# Patient Record
Sex: FEMALE | Race: WHITE | NOT HISPANIC OR LATINO | Employment: FULL TIME | URBAN - METROPOLITAN AREA
[De-identification: names, ages, dates, MRNs, and addresses within clinical notes are randomized per-mention and may not be internally consistent; named-entity substitution may affect disease eponyms.]

---

## 2023-10-22 DIAGNOSIS — Z94.0 TRANSPLANTED KIDNEY: ICD-10-CM

## 2023-10-22 RX ORDER — TACROLIMUS 0.5 MG/1
CAPSULE ORAL
Qty: 270 CAPSULE | Refills: 3 | Status: SHIPPED | OUTPATIENT
Start: 2023-10-22

## 2023-10-30 DIAGNOSIS — Z94.0 KIDNEY TRANSPLANTED: ICD-10-CM

## 2023-10-30 DIAGNOSIS — I12.9 PARENCHYMAL RENAL HYPERTENSION, STAGE 1 THROUGH STAGE 4 OR UNSPECIFIED CHRONIC KIDNEY DISEASE: ICD-10-CM

## 2023-10-30 DIAGNOSIS — E78.5 DYSLIPIDEMIA: ICD-10-CM

## 2023-10-30 RX ORDER — MYCOPHENOLATE MOFETIL 500 MG/1
500 TABLET ORAL 2 TIMES DAILY
Qty: 180 TABLET | Refills: 3 | Status: SHIPPED | OUTPATIENT
Start: 2023-10-30

## 2023-10-30 RX ORDER — PREDNISONE 5 MG/1
5 TABLET ORAL DAILY
Qty: 90 TABLET | Refills: 3 | Status: SHIPPED | OUTPATIENT
Start: 2023-10-30

## 2023-11-13 ENCOUNTER — TELEPHONE (OUTPATIENT)
Dept: NEPHROLOGY | Facility: CLINIC | Age: 58
End: 2023-11-13

## 2023-11-13 NOTE — TELEPHONE ENCOUNTER
Spoke with patient about the following, she expressed understanding and thanked us for the call:    ----- Message from Sridhar Jefferson MD sent at 11/13/2023  8:14 AM EST -----  Labs look good  ----- Message -----  From: [facility] Results In  Sent: 11/13/2023   8:05 AM EST  To: Sridhar Jefferson MD

## 2023-11-14 LAB
ALBUMIN SERPL-MCNC: 4.2 G/DL (ref 3.6–5.1)
ALBUMIN/GLOB SERPL: 1.8 (CALC) (ref 1–2.5)
ALP SERPL-CCNC: 49 U/L (ref 37–153)
ALT SERPL-CCNC: 11 U/L (ref 6–29)
AST SERPL-CCNC: 15 U/L (ref 10–35)
BASOPHILS # BLD AUTO: 63 CELLS/UL (ref 0–200)
BASOPHILS NFR BLD AUTO: 1 %
BILIRUB SERPL-MCNC: 0.4 MG/DL (ref 0.2–1.2)
BUN SERPL-MCNC: 16 MG/DL (ref 7–25)
BUN/CREAT SERPL: NORMAL (CALC) (ref 6–22)
CALCIUM SERPL-MCNC: 9.4 MG/DL (ref 8.6–10.4)
CALCIUM SERPL-MCNC: 9.4 MG/DL (ref 8.6–10.4)
CHLORIDE SERPL-SCNC: 109 MMOL/L (ref 98–110)
CO2 SERPL-SCNC: 27 MMOL/L (ref 20–32)
CREAT SERPL-MCNC: 0.72 MG/DL (ref 0.5–1.03)
CREAT UR-MCNC: 101 MG/DL (ref 20–275)
EOSINOPHIL # BLD AUTO: 82 CELLS/UL (ref 15–500)
EOSINOPHIL NFR BLD AUTO: 1.3 %
ERYTHROCYTE [DISTWIDTH] IN BLOOD BY AUTOMATED COUNT: 12.3 % (ref 11–15)
GFR/BSA.PRED SERPLBLD CYS-BASED-ARV: 98 ML/MIN/1.73M2
GLOBULIN SER CALC-MCNC: 2.4 G/DL (CALC) (ref 1.9–3.7)
GLUCOSE SERPL-MCNC: 74 MG/DL (ref 65–99)
HCT VFR BLD AUTO: 43.9 % (ref 35–45)
HGB BLD-MCNC: 14.9 G/DL (ref 11.7–15.5)
LYMPHOCYTES # BLD AUTO: 2602 CELLS/UL (ref 850–3900)
LYMPHOCYTES NFR BLD AUTO: 41.3 %
MAGNESIUM SERPL-MCNC: 1.6 MG/DL (ref 1.5–2.5)
MCH RBC QN AUTO: 32 PG (ref 27–33)
MCHC RBC AUTO-ENTMCNC: 33.9 G/DL (ref 32–36)
MCV RBC AUTO: 94.2 FL (ref 80–100)
MONOCYTES # BLD AUTO: 844 CELLS/UL (ref 200–950)
MONOCYTES NFR BLD AUTO: 13.4 %
NEUTROPHILS # BLD AUTO: 2709 CELLS/UL (ref 1500–7800)
NEUTROPHILS NFR BLD AUTO: 43 %
PHOSPHATE SERPL-MCNC: 3.5 MG/DL (ref 2.5–4.5)
PLATELET # BLD AUTO: 198 THOUSAND/UL (ref 140–400)
PMV BLD REES-ECKER: 10.7 FL (ref 7.5–12.5)
POTASSIUM SERPL-SCNC: 3.8 MMOL/L (ref 3.5–5.3)
PROT SERPL-MCNC: 6.6 G/DL (ref 6.1–8.1)
PROT UR-MCNC: 8 MG/DL (ref 5–24)
PROT/CREAT UR: 0.08 MG/MG CREAT (ref 0.02–0.18)
PROT/CREAT UR: 79 MG/G CREAT (ref 24–184)
PTH-INTACT SERPL-MCNC: 30 PG/ML (ref 16–77)
RBC # BLD AUTO: 4.66 MILLION/UL (ref 3.8–5.1)
SODIUM SERPL-SCNC: 143 MMOL/L (ref 135–146)
TACROLIMUS BLD-MCNC: 4 MCG/L
WBC # BLD AUTO: 6.3 THOUSAND/UL (ref 3.8–10.8)

## 2023-11-15 ENCOUNTER — TELEPHONE (OUTPATIENT)
Dept: NEPHROLOGY | Facility: CLINIC | Age: 58
End: 2023-11-15

## 2023-11-15 NOTE — TELEPHONE ENCOUNTER
Spoke with patient about the following, she expressed understanding and thanked us for the call:    ----- Message from Grace Herring MD sent at 11/14/2023  4:39 PM EST -----  Tacrolimus level good

## 2023-11-27 NOTE — PROGRESS NOTES
RENAL FOLLOW UP NOTE:.td    ASSESSMENT AND PLAN:  1.  CKD stage 2.:  Etiology:  Status post LRT 1997. Baseline creatinine:  Less than 1  Current creatinine:  0.72 mg/dL at baseline  Urine protein creatinine ratio: Insignificant at goal  Tacrolimus trough level : 4.0 at goal  Recommendations:  Treat hypertension-please see below  Treat dyslipidemia-please see below  Continue immunosuppressive medications as currently prescribed  Maintain proteinuria less than 1 g or as low as possible  Avoid nephrotoxic agents such as NSAIDs, patient counseled as such  2.  Volume:  Euvolemic  3. Hypertension:   Home blood pressure readings:    A.m.: 130/77, standing 115/74  P.m. 136/77, standing 121/74  Heart rate: 60-70 range     Goal blood pressure:  Less than 130/80  Recommendations:  Push nonmedical regimen including weight loss, isotonic exercise and avoidance of salt; patient counseled as such  Medication changes today:  Change timing of losartan 25 mg daily in the morning to help with evening readings  Because of orthostasis I am not can make any changes  Continue to push diet and exercise     4. Electrolytes: All acceptable     5. 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 Dyslipidemia:  Goal LDL:  Less than 100  Current lipid profile:  LDL 59/HDL 73/triglycerides 106 from July 2023  Recommendations:  No changes at this time as patient is at goal     7. Anemia:  Normal  essentially at 14.9, with a normal platelet count a 636 K     8. Other problems:  History of urinary tract infections monitored:   Follow-up urinalysis back in April was unremarkable: UA from 4/15/2023 no evidence of urinary tract infection; ultrasound was normal transplant kidney mild increased resistive index from April 24, 2020  Patient with dizziness abnormal ECG from 02/07/2020 which was personally reviewed by Dr. Todd Tejeda who felt there is no significant change. Dr. Todd Tejeda felt she had vertigo which essentially resolved. No further cardiac testing was recommended at that time. We will order a bone density to make sure the Fosamax and vitamin D calcium is maintaining good bone health     GI health maintenance:  Patient had her last colonoscopy September 28, 2018 requires another colonoscopy September 2023 (5 years later per Dr. Gissel Hoffmann) patient is aware she will schedule for early 2024    PATIENT INSTRUCTIONS:    Patient Instructions   Visit summary:  - Overall labs look great including kidney function  - Blood pressure is close to goal I am going to just adjust 1 medication as we discussed please see below graph we will obtain a bone density to make sure your you have good bone health        1. Medication changes today:  Please take losartan 25 mg daily in the morning to help with your evening readings    2. General instructions:  Continue to push diet exercise avoidance of salt  Continue with maintenance recommendations please see below    Please arrange for DEXA scan    .     3.  Please take 1 week a blood pressure readings prior to your next appointment    AS FOLLOWS  MORNING AND EVENING, SITTING AND STANDING as follows:  TAKE THE MORNING READINGS BEFORE ANY MEDICATIONS AND WHEN YOU ARE RELAXED FOR SEVERAL MINUTES  TAKE THE EVENING READINGS:  BETWEEN 7-10 P.M.; PRIOR TO ANY MEDICATIONS; AT LEAST IN OUR  FROM DINNER; AND CERTAINLY AFTER RELAXING FOR A FEW MINUTES  PLEASE INCLUDE HEART RATE WITH YOUR BLOOD PRESSURE READINGS  When taking standing readings, keep your arm supported at heart level and not dangling  Make sure you are sitting with your back supported and feet on the ground and do not cross your legs or feet  Make sure you have not taken any coffee or caffeine products or exercised or smoke cigarettes at least 30 minutes before taking your blood pressure  Then please mail these readings into the office    4. Follow-up in 4 months  Please bring in 1 week a blood pressure readings morning evening, sitting and standing is outlined above  PLEASE BRING AN YOUR BLOOD PRESSURE MACHINE TO CORRELATE WITH THE OFFICE MACHINE AT THIS NEXT SCHEDULED VISIT  Please go for fasting lab work 1-2 weeks prior to your appointment      5. General non medical recommendations:  AVOID SALT BUT NOT ADDING AN READING LABELS TO MAKE SURE THERE IS LOW-SALT IN THE FOOD THAT YOU ARE EATING  Goal is less than 2 g of sodium intake or less than 5 g of sodium chloride intake per day    Avoid nonsteroidal anti-inflammatory drugs such as Naprosyn, ibuprofen, Aleve, Advil, Celebrex, Meloxicam (Mobic) etc.  You can use Tylenol as needed if you do not have any liver condition to be concerned about    Avoid medications such as Sudafed or decongestants and antihistamines that contained the D component which is the decongestant. You can take antihistamines without the decongestant or D component. Try to avoid medications such as pantoprazole or  Protonix/Nexium or Esomeprazole)/Prilosec or omeprazole/Prevacid or lansoprazole/AcipHex or Rabeprazole. If you are able to, use Pepcid as this is safer for your kidneys. Try to exercise at least 30 minutes 3 days a week to begin with with an ultimate goal of 5 days a week for at least 30 minutes    Try to lose 5-10 lb by your next visit    Please do not drink more than 2 glasses of alcohol/wine on a daily basis as this may contribute to your high blood pressure.       6.Maintenance recommendations:  Please see your dentist at least twice a year for oral/mouth cancer screening  Please see your dermatologist/skin doctor at least twice a year for skin cancer screening  Please follow-up with your gastroenterologist for colon cancer screening when  appropriate per their recommendations  Please see your obstetrician/gynecologist for cancer screening at least once a year per their recommendations  Please obtain the following vaccines:  Yearly flu/influenza vaccine  Pneumonia vaccine every 3-5 years:  Prevnar 13 and Pneumovax 23  Shingrix which is the new non live virus, this is against shingles/herpes zoster  Tetanus:Td/DTaP vaccine   COVID-19 vaccine: At this time only [address] DO NOT EXCEPT OR GET THE J AND J VACCINE!!  YOU SHOULD NEVER GET ANY LIVE VIRUSES:  INCLUDING ZOSTAVAX OR MMR:  SHOULD NEVER BE TAKEN    BECAUSE OF YOUR KIDNEY TRANSPLANT , YOU ARE TAKING IMMUNE SUPPRESSING MEDICATIONS WHICH MAKES YOU MORE SUSCEPTIBLE TO NOT ONLY KIRTI/CATCHING COVID-19 VIRUS, BUT ALSO SUFFERING FROM ITS POTENTIAL SEVERE COMPLICATIONS. BECAUSE OF THIS, I WOULD STRONGLY URGE YOU TO TRY TO AVOID CATCHING/KIRTI COVID-19 VIRUS AS MUCH AS POSSIBLE. CERTAINLY IF YOU DEVELOPED ANY SYMPTOMS SUGGESTIVE OF COVID-19 VIRUS PLEASE CONTACT YOUR MEDICAL PHYSICIAN RIGHT AWAY, OR IF YOU ARE VERY SICK, PLEASE GO RIGHT TO THE HOSPITAL         Subjective: There has been no hospitalizations or acute illnesses since last visit. The patient overall is feeling well. No fevers, chills, or cough or colds. Good appetite and good energy  No hematuria, dysuria, voiding symptoms or foamy urine  No gastrointestinal symptoms  No cardiovascular symptoms including swelling of the legs  No headaches, dizziness or lightheadedness  Blood pressure medications:  Calan  mg twice a day  Losartan 25 mg daily after dinner    Renal pertinent medications:  Prograf 0.5 mg every morning; 0.5 mg every p.m.  Monday Wednesday and Friday and every other day 1 mg  Prednisone 5 mg daily  CellCept 500 mg twice a day  Magnesium 500 mg daily  Macrodantin 50 mg daily  Atorvastatin 10 mg every other day    ROS:  See HPI, otherwise review of systems as completely reviewed with the patient are negative    Past Medical History:   Diagnosis Date    Chronic kidney disease     Hypertension     Renal disorder     Urinary tract infection     Vertigo      Past Surgical History:   Procedure Laterality Date    CHOLECYSTECTOMY      PARATHYROID GLAND SURGERY      TRANSPLANTATION RENAL      Kidney X2    URETERAL REIMPLANTATION OF TRANSPLANTED KIDNEY       Family History   Problem Relation Age of Onset    No Known Problems Mother     Atrial fibrillation Father     Cancer Father         Prostate      reports that she has been smoking cigarettes. She has never used smokeless tobacco. She reports that she does not drink alcohol and does not use drugs. I COMPLETELY REVIEWED THE PAST MEDICAL HISTORY/PAST SURGICAL HISTORY/SOCIAL HISTORY/FAMILY HISTORY/AND MEDICATIONS  AND UPDATED ALL    Objective:     Vitals:   BP sitting on right: 112/78 with heart rate of 60 and regular  BP standing on right: 104/78 with a heart rate of 72 and regular    Weight (last 2 days)       Date/Time Weight    12/06/23 0804 66.9 (147.4)          Wt Readings from Last 3 Encounters:   12/06/23 66.9 kg (147 lb 6.4 oz)   08/08/23 66.7 kg (147 lb)   04/12/23 66.9 kg (147 lb 6.4 oz)       Body mass index is 27.85 kg/m².     Physical Exam: General:  No acute distress  Skin:  No acute rash  Eyes:  No scleral icterus, noninjected, no discharge from eyes  ENT:  Moist mucous membranes  Neck:  Supple, no jugular venous distention, trachea is midline, no lymphadenopathy and no thyromegaly  Back   No CVAT  Chest:  Clear to auscultation and percussion, good respiratory effort  CVS:  Regular rate and rhythm without a rub, or gallops or murmurs  Abdomen:  Soft and nontender with normal bowel sounds; in particular no tenderness over left lower quadrant transplant site  Extremities:  No cyanosis and no edema, no arthritic changes, normal range of motion  Neuro:  Grossly intact  Psych:  Alert, oriented x3 and appropriate      Medications:    Current Outpatient Medications:     alendronate (FOSAMAX) 35 mg tablet, TAKE 1 TABLET BY MOUTH BY MOUTH ONCE A WEEK, Disp: 12 tablet, Rfl: 4    ALPRAZolam Dameon Lemon) 0.25 mg tablet, if needed, Disp: , Rfl:     atorvastatin (LIPITOR) 10 mg tablet, TAKE 1 TABLET BY MOUTH EVERY OTHER DAY, Disp: 45 tablet, Rfl: 4    calcium-vitamin D (OSCAL 500 + D) 500 mg-200 units per tablet, Take 1 tablet by mouth daily  , Disp: , Rfl:     losartan (COZAAR) 25 mg tablet, TAKE 2 TABLETS (50 MG TOTAL) BY MOUTH DAILY AFTER DINNER (Patient taking differently: Take 25 mg by mouth daily after dinner), Disp: 180 tablet, Rfl: 3    MAGNESIUM PO, Take 500 mg by mouth daily, Disp: , Rfl:     mycophenolate (CELLCEPT) 500 mg tablet, Take 1 tablet (500 mg total) by mouth 2 (two) times a day, Disp: 180 tablet, Rfl: 3    nitrofurantoin (MACRODANTIN) 50 mg capsule, TAKE 1 CAPSULE BY MOUTH EVERY DAY, Disp: 90 capsule, Rfl: 3    predniSONE 5 mg tablet, Take 1 tablet (5 mg total) by mouth daily, Disp: 90 tablet, Rfl: 3    Sodium Fluoride 5000 Sensitive 1.1-5 % PSTE, USE TWICE DAILY IN PLACE OF TOOTHPASTE, Disp: , Rfl:     tacrolimus (PROGRAF) 0.5 mg capsule, TAKE 1 CAPSULE (0.5 MG TOTAL) BY MOUTH  in the a.m. and in the pm 1 capsule on Monday, Wednesday and Friday and 2 capsules on Tuesday, Thursday, Saturday, and Sunday, Disp: 270 capsule, Rfl: 3    verapamil (CALAN-SR) 240 mg CR tablet, TAKE 1 TABLET (240 MG TOTAL) BY MOUTH DAILY IN THE EARLY MORNING (Patient taking differently: Take 120 mg by mouth 2 (two) times a day), Disp: 90 tablet, Rfl: 3    Calcium Carbonate 1500 (600 Ca) MG TABS, Take 600 mg by mouth daily (Patient not taking: Reported on 12/6/2023), Disp: , Rfl:     Lab, Imaging and other studies: I have personally reviewed pertinent labs.   Laboratory Results:  Results for orders placed or performed in visit on 11/11/23   PTH, Intact and Calcium   Result Value Ref Range    PTH, Intact 30 16 - 77 pg/mL    Calcium 9.4 8.6 - 10.4 mg/dL   Magnesium   Result Value Ref Range    Magnesium, Serum 1.6 1.5 - 2.5 mg/dL   Phosphorus   Result Value Ref Range    Phosphorus, Serum 3.5 2.5 - 4.5 mg/dL Comprehensive metabolic panel   Result Value Ref Range    Glucose, Random 74 65 - 99 mg/dL    BUN 16 7 - 25 mg/dL    Creatinine 0.72 0.50 - 1.03 mg/dL    eGFR 98 > OR = 60 mL/min/1.73m2    SL AMB BUN/CREATININE RATIO SEE NOTE: 6 - 22 (calc)    Sodium 143 135 - 146 mmol/L    Potassium 3.8 3.5 - 5.3 mmol/L    Chloride 109 98 - 110 mmol/L    CO2 27 20 - 32 mmol/L    Calcium 9.4 8.6 - 10.4 mg/dL    Protein, Total 6.6 6.1 - 8.1 g/dL    Albumin 4.2 3.6 - 5.1 g/dL    Globulin 2.4 1.9 - 3.7 g/dL (calc)    Albumin/Globulin Ratio 1.8 1.0 - 2.5 (calc)    TOTAL BILIRUBIN 0.4 0.2 - 1.2 mg/dL    Alkaline Phosphatase 49 37 - 153 U/L    AST 15 10 - 35 U/L    ALT 11 6 - 29 U/L   CBC and differential   Result Value Ref Range    White Blood Cell Count 6.3 3.8 - 10.8 Thousand/uL    Red Blood Cell Count 4.66 3.80 - 5.10 Million/uL    Hemoglobin 14.9 11.7 - 15.5 g/dL    HCT 43.9 35.0 - 45.0 %    MCV 94.2 80.0 - 100.0 fL    MCH 32.0 27.0 - 33.0 pg    MCHC 33.9 32.0 - 36.0 g/dL    RDW 12.3 11.0 - 15.0 %    Platelet Count 296 982 - 400 Thousand/uL    SL AMB MPV 10.7 7.5 - 12.5 fL    Neutrophils (Absolute) 2,709 1,500 - 7,800 cells/uL    Lymphocytes (Absolute) 2,602 850 - 3,900 cells/uL    Monocytes (Absolute) 844 200 - 950 cells/uL    Eosinophils (Absolute) 82 15 - 500 cells/uL    Basophils ABS 63 0 - 200 cells/uL    Neutrophils 43 %    Lymphocytes 41.3 %    Monocytes 13.4 %    Eosinophils 1.3 %    Basophils PCT 1.0 %    Always Message     Protein, Total w/Creat, Random Urine   Result Value Ref Range    Creatinine, Urine 101 20 - 275 mg/dL    Protein/Creat Ratio 79 24 - 184 mg/g creat    Protein/Creat Ratio 0.079 0.024 - 0.184 mg/mg creat    Total Protein, Urine 8 5 - 24 mg/dL   Tacrolimus level   Result Value Ref Range    Tacrolimus, Highly Sensitive, LC/MS/MS 4.0 (L) mcg/L             Invalid input(s): "ALBUMIN"      Radiology review:   chest X-ray    Ultrasound      Portions of the record may have been created with voice recognition software. Occasional wrong word or "sound a like" substitutions may have occurred due to the inherent limitations of voice recognition software. Read the chart carefully and recognize, using context, where substitutions have occurred.

## 2023-12-06 ENCOUNTER — OFFICE VISIT (OUTPATIENT)
Dept: NEPHROLOGY | Facility: CLINIC | Age: 58
End: 2023-12-06
Payer: COMMERCIAL

## 2023-12-06 VITALS — HEIGHT: 61 IN | BODY MASS INDEX: 27.83 KG/M2 | WEIGHT: 147.4 LBS

## 2023-12-06 DIAGNOSIS — E83.9 CHRONIC KIDNEY DISEASE-MINERAL AND BONE DISORDER: ICD-10-CM

## 2023-12-06 DIAGNOSIS — N18.9 CHRONIC KIDNEY DISEASE-MINERAL AND BONE DISORDER: ICD-10-CM

## 2023-12-06 DIAGNOSIS — M89.9 CHRONIC KIDNEY DISEASE-MINERAL AND BONE DISORDER: ICD-10-CM

## 2023-12-06 DIAGNOSIS — D84.9 IMMUNOSUPPRESSION (HCC): ICD-10-CM

## 2023-12-06 DIAGNOSIS — E78.5 DYSLIPIDEMIA: ICD-10-CM

## 2023-12-06 DIAGNOSIS — N18.2 CKD (CHRONIC KIDNEY DISEASE) STAGE 2, GFR 60-89 ML/MIN: ICD-10-CM

## 2023-12-06 DIAGNOSIS — Z94.0 KIDNEY TRANSPLANTED: ICD-10-CM

## 2023-12-06 DIAGNOSIS — I12.9 HYPERTENSIVE CHRONIC KIDNEY DISEASE WITH STAGE 1 THROUGH STAGE 4 CHRONIC KIDNEY DISEASE, OR UNSPECIFIED CHRONIC KIDNEY DISEASE: Primary | ICD-10-CM

## 2023-12-06 DIAGNOSIS — N25.81 SECONDARY HYPERPARATHYROIDISM OF RENAL ORIGIN (HCC): ICD-10-CM

## 2023-12-06 PROCEDURE — 99214 OFFICE O/P EST MOD 30 MIN: CPT | Performed by: INTERNAL MEDICINE

## 2023-12-06 NOTE — PATIENT INSTRUCTIONS
Visit summary:  - Overall labs look great including kidney function  - Blood pressure is close to goal I am going to just adjust 1 medication as we discussed please see below graph we will obtain a bone density to make sure your you have good bone health        1. Medication changes today:  Please take losartan 25 mg daily in the morning to help with your evening readings    2. General instructions:  Continue to push diet exercise avoidance of salt  Continue with maintenance recommendations please see below    Please arrange for DEXA scan    . 3.  Please take 1 week a blood pressure readings prior to your next appointment    AS FOLLOWS  MORNING AND EVENING, SITTING AND STANDING as follows:  TAKE THE MORNING READINGS BEFORE ANY MEDICATIONS AND WHEN YOU ARE RELAXED FOR SEVERAL MINUTES  TAKE THE EVENING READINGS:  BETWEEN 7-10 P.M.; PRIOR TO ANY MEDICATIONS; AT LEAST IN OUR  FROM DINNER; AND CERTAINLY AFTER RELAXING FOR A FEW MINUTES  PLEASE INCLUDE HEART RATE WITH YOUR BLOOD PRESSURE READINGS  When taking standing readings, keep your arm supported at heart level and not dangling  Make sure you are sitting with your back supported and feet on the ground and do not cross your legs or feet  Make sure you have not taken any coffee or caffeine products or exercised or smoke cigarettes at least 30 minutes before taking your blood pressure  Then please mail these readings into the office    4. Follow-up in 4 months  Please bring in 1 week a blood pressure readings morning evening, sitting and standing is outlined above  PLEASE BRING AN YOUR BLOOD PRESSURE MACHINE TO CORRELATE WITH THE OFFICE MACHINE AT THIS NEXT SCHEDULED VISIT  Please go for fasting lab work 1-2 weeks prior to your appointment      5.   General non medical recommendations:  AVOID SALT BUT NOT ADDING AN READING LABELS TO MAKE SURE THERE IS LOW-SALT IN THE FOOD THAT YOU ARE EATING  Goal is less than 2 g of sodium intake or less than 5 g of sodium chloride intake per day    Avoid nonsteroidal anti-inflammatory drugs such as Naprosyn, ibuprofen, Aleve, Advil, Celebrex, Meloxicam (Mobic) etc.  You can use Tylenol as needed if you do not have any liver condition to be concerned about    Avoid medications such as Sudafed or decongestants and antihistamines that contained the D component which is the decongestant. You can take antihistamines without the decongestant or D component. Try to avoid medications such as pantoprazole or  Protonix/Nexium or Esomeprazole)/Prilosec or omeprazole/Prevacid or lansoprazole/AcipHex or Rabeprazole. If you are able to, use Pepcid as this is safer for your kidneys. Try to exercise at least 30 minutes 3 days a week to begin with with an ultimate goal of 5 days a week for at least 30 minutes    Try to lose 5-10 lb by your next visit    Please do not drink more than 2 glasses of alcohol/wine on a daily basis as this may contribute to your high blood pressure. 6.Maintenance recommendations:  Please see your dentist at least twice a year for oral/mouth cancer screening  Please see your dermatologist/skin doctor at least twice a year for skin cancer screening  Please follow-up with your gastroenterologist for colon cancer screening when  appropriate per their recommendations  Please see your obstetrician/gynecologist for cancer screening at least once a year per their recommendations  Please obtain the following vaccines:  Yearly flu/influenza vaccine  Pneumonia vaccine every 3-5 years:  Prevnar 13 and Pneumovax 23  Shingrix which is the new non live virus, this is against shingles/herpes zoster  Tetanus:Td/DTaP vaccine   COVID-19 vaccine: At this time only [address]   DO NOT EXCEPT OR GET THE J AND J VACCINE!!  YOU SHOULD NEVER GET ANY LIVE VIRUSES:  INCLUDING ZOSTAVAX OR MMR:  SHOULD NEVER BE TAKEN    BECAUSE OF YOUR KIDNEY TRANSPLANT , YOU ARE TAKING IMMUNE SUPPRESSING MEDICATIONS WHICH MAKES YOU MORE SUSCEPTIBLE TO NOT ONLY KIRTI/CATCHING COVID-19 VIRUS, BUT ALSO SUFFERING FROM ITS POTENTIAL SEVERE COMPLICATIONS. BECAUSE OF THIS, I WOULD STRONGLY URGE YOU TO TRY TO AVOID CATCHING/KIRTI COVID-19 VIRUS AS MUCH AS POSSIBLE.   CERTAINLY IF YOU DEVELOPED ANY SYMPTOMS SUGGESTIVE OF COVID-19 VIRUS PLEASE CONTACT YOUR MEDICAL PHYSICIAN RIGHT AWAY, OR IF YOU ARE VERY SICK, PLEASE GO RIGHT TO [facility]

## 2023-12-06 NOTE — LETTER
December 6, 2023     Marylou Thoams, [address] [facility]  [address] [address]    Patient: Cooper Okeefe   YOB: 1965   Date of Visit: 12/6/2023       Dear Dr. Cindy Pardo:    Thank you for referring Cooper Okeefe to me for evaluation. Below are my notes for this consultation. If you have questions, please do not hesitate to call me. I look forward to following your patient along with you. Sincerely,        Philip Mckeon MD        CC: No Recipients    Philip Mckeon MD  12/6/2023  8:20 AM  Sign when Signing Visit  RENAL FOLLOW UP NOTE:.td    ASSESSMENT AND PLAN:  1.  CKD stage 2.:  Etiology:  Status post LRT 1997. Baseline creatinine:  Less than 1  Current creatinine:  0.72 mg/dL at baseline  Urine protein creatinine ratio: Insignificant at goal  Tacrolimus trough level : 4.0 at goal  Recommendations:  Treat hypertension-please see below  Treat dyslipidemia-please see below  Continue immunosuppressive medications as currently prescribed  Maintain proteinuria less than 1 g or as low as possible  Avoid nephrotoxic agents such as NSAIDs, patient counseled as such  2.  Volume:  Euvolemic  3. Hypertension:   Home blood pressure readings:    A.m.: 130/77, standing 115/74  P.m. 136/77, standing 121/74  Heart rate: 60-70 range     Goal blood pressure:  Less than 130/80  Recommendations:  Push nonmedical regimen including weight loss, isotonic exercise and avoidance of salt; patient counseled as such  Medication changes today:  Change timing of losartan 25 mg daily in the morning to help with evening readings  Because of orthostasis I am not can make any changes  Continue to push diet and exercise     4. Electrolytes: All acceptable     5. 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   Dyslipidemia:  Goal LDL:  Less than 100  Current lipid profile:  LDL 59/HDL 73/triglycerides 106 from July 2023  Recommendations:  No changes at this time as patient is at goal     7. Anemia:  Normal  essentially at 14.9, with a normal platelet count a 461 K     8. Other problems:  History of urinary tract infections monitored: Follow-up urinalysis back in April was unremarkable: UA from 4/15/2023 no evidence of urinary tract infection; ultrasound was normal transplant kidney mild increased resistive index from April 24, 2020  Patient with dizziness abnormal ECG from 02/07/2020 which was personally reviewed by Dr. Marshall Davila who felt there is no significant change. Dr. Marshall Davila felt she had vertigo which essentially resolved. No further cardiac testing was recommended at that time. GI health maintenance:  Patient had her last colonoscopy September 28, 2018 requires another colonoscopy September 2023 (5 years later per Dr. Kamilla Cheatham) patient is aware she will schedule for early 2024    PATIENT INSTRUCTIONS:    Patient Instructions   Visit summary:  - Overall labs look great including kidney function  - Blood pressure is close to goal I am going to just adjust 1 medication as we discussed please see below        1. Medication changes today:  Please take losartan 25 mg daily in the morning to help with your evening readings    2. General instructions:  Continue to push diet exercise avoidance of salt  Continue with maintenance recommendations please see below    .     3.  Please take 1 week a blood pressure readings prior to your next appointment    AS FOLLOWS  MORNING AND EVENING, SITTING AND STANDING as follows:  TAKE THE MORNING READINGS BEFORE ANY MEDICATIONS AND WHEN YOU ARE RELAXED FOR SEVERAL MINUTES  TAKE THE EVENING READINGS:  BETWEEN 7-10 P.M.; PRIOR TO ANY MEDICATIONS; AT LEAST IN OUR  FROM DINNER; AND CERTAINLY AFTER RELAXING FOR A FEW MINUTES  PLEASE INCLUDE HEART RATE WITH YOUR BLOOD PRESSURE READINGS  When taking standing readings, keep your arm supported at heart level and not dangling  Make sure you are sitting with your back supported and feet on the ground and do not cross your legs or feet  Make sure you have not taken any coffee or caffeine products or exercised or smoke cigarettes at least 30 minutes before taking your blood pressure  Then please mail these readings into the office    4. Follow-up in 4 months  Please bring in 1 week a blood pressure readings morning evening, sitting and standing is outlined above  PLEASE BRING AN YOUR BLOOD PRESSURE MACHINE TO CORRELATE WITH THE OFFICE MACHINE AT THIS NEXT SCHEDULED VISIT  Please go for fasting lab work 1-2 weeks prior to your appointment      5. General non medical recommendations:  AVOID SALT BUT NOT ADDING AN READING LABELS TO MAKE SURE THERE IS LOW-SALT IN THE FOOD THAT YOU ARE EATING  Goal is less than 2 g of sodium intake or less than 5 g of sodium chloride intake per day    Avoid nonsteroidal anti-inflammatory drugs such as Naprosyn, ibuprofen, Aleve, Advil, Celebrex, Meloxicam (Mobic) etc.  You can use Tylenol as needed if you do not have any liver condition to be concerned about    Avoid medications such as Sudafed or decongestants and antihistamines that contained the D component which is the decongestant. You can take antihistamines without the decongestant or D component. Try to avoid medications such as pantoprazole or  Protonix/Nexium or Esomeprazole)/Prilosec or omeprazole/Prevacid or lansoprazole/AcipHex or Rabeprazole. If you are able to, use Pepcid as this is safer for your kidneys. Try to exercise at least 30 minutes 3 days a week to begin with with an ultimate goal of 5 days a week for at least 30 minutes    Try to lose 5-10 lb by your next visit    Please do not drink more than 2 glasses of alcohol/wine on a daily basis as this may contribute to your high blood pressure.       6.Maintenance recommendations:  Please see your dentist at least twice a year for oral/mouth cancer screening  Please see your dermatologist/skin doctor at least twice a year for skin cancer screening  Please follow-up with your gastroenterologist for colon cancer screening when  appropriate per their recommendations  Please see your obstetrician/gynecologist for cancer screening at least once a year per their recommendations  Please obtain the following vaccines:  Yearly flu/influenza vaccine  Pneumonia vaccine every 3-5 years:  Prevnar 13 and Pneumovax 23  Shingrix which is the new non live virus, this is against shingles/herpes zoster  Tetanus:Td/DTaP vaccine   COVID-19 vaccine: At this time only [address] DO NOT EXCEPT OR GET THE J AND J VACCINE!!  YOU SHOULD NEVER GET ANY LIVE VIRUSES:  INCLUDING ZOSTAVAX OR MMR:  SHOULD NEVER BE TAKEN    BECAUSE OF YOUR KIDNEY TRANSPLANT , YOU ARE TAKING IMMUNE SUPPRESSING MEDICATIONS WHICH MAKES YOU MORE SUSCEPTIBLE TO NOT ONLY KIRTI/CATCHING COVID-19 VIRUS, BUT ALSO SUFFERING FROM ITS POTENTIAL SEVERE COMPLICATIONS. BECAUSE OF THIS, I WOULD STRONGLY URGE YOU TO TRY TO AVOID CATCHING/KIRTI COVID-19 VIRUS AS MUCH AS POSSIBLE. CERTAINLY IF YOU DEVELOPED ANY SYMPTOMS SUGGESTIVE OF COVID-19 VIRUS PLEASE CONTACT YOUR MEDICAL PHYSICIAN RIGHT AWAY, OR IF YOU ARE VERY SICK, PLEASE GO RIGHT TO THE HOSPITAL         Subjective: There has been no hospitalizations or acute illnesses since last visit. The patient overall is feeling well. No fevers, chills, or cough or colds. Good appetite and good energy  No hematuria, dysuria, voiding symptoms or foamy urine  No gastrointestinal symptoms  No cardiovascular symptoms including swelling of the legs  No headaches, dizziness or lightheadedness  Blood pressure medications:  Calan  mg twice a day  Losartan 25 mg daily after dinner    Renal pertinent medications:  Prograf 0.5 mg every morning; 0.5 mg every p.m.  Monday Wednesday and Friday and every other day 1 mg  Prednisone 5 mg daily  CellCept 500 mg twice a day  Magnesium 500 mg daily  Macrodantin 50 mg daily  Atorvastatin 10 mg every other day    ROS:  See HPI, otherwise review of systems as completely reviewed with the patient are negative    Past Medical History:   Diagnosis Date   • Chronic kidney disease    • Hypertension    • Renal disorder    • Urinary tract infection    • Vertigo      Past Surgical History:   Procedure Laterality Date   • CHOLECYSTECTOMY     • PARATHYROID GLAND SURGERY     • TRANSPLANTATION RENAL      Kidney X2   • URETERAL REIMPLANTATION OF TRANSPLANTED KIDNEY       Family History   Problem Relation Age of Onset   • No Known Problems Mother    • Atrial fibrillation Father    • Cancer Father         Prostate      reports that she has been smoking cigarettes. She has never used smokeless tobacco. She reports that she does not drink alcohol and does not use drugs. I COMPLETELY REVIEWED THE PAST MEDICAL HISTORY/PAST SURGICAL HISTORY/SOCIAL HISTORY/FAMILY HISTORY/AND MEDICATIONS  AND UPDATED ALL    Objective:     Vitals:   BP sitting on right: 112/78 with heart rate of 60 and regular  BP standing on right: 104/78 with a heart rate of 72 and regular    Weight (last 2 days)       Date/Time Weight    12/06/23 0804 66.9 (147.4)          Wt Readings from Last 3 Encounters:   12/06/23 66.9 kg (147 lb 6.4 oz)   08/08/23 66.7 kg (147 lb)   04/12/23 66.9 kg (147 lb 6.4 oz)       Body mass index is 27.85 kg/m².     Physical Exam: General:  No acute distress  Skin:  No acute rash  Eyes:  No scleral icterus, noninjected, no discharge from eyes  ENT:  Moist mucous membranes  Neck:  Supple, no jugular venous distention, trachea is midline, no lymphadenopathy and no thyromegaly  Back   No CVAT  Chest:  Clear to auscultation and percussion, good respiratory effort  CVS:  Regular rate and rhythm without a rub, or gallops or murmurs  Abdomen:  Soft and nontender with normal bowel sounds; in particular no tenderness over left lower quadrant transplant site  Extremities:  No cyanosis and no edema, no arthritic changes, normal range of motion  Neuro:  Grossly intact  Psych:  Alert, oriented x3 and appropriate      Medications:    Current Outpatient Medications:   •  alendronate (FOSAMAX) 35 mg tablet, TAKE 1 TABLET BY MOUTH BY MOUTH ONCE A WEEK, Disp: 12 tablet, Rfl: 4  •  ALPRAZolam (XANAX) 0.25 mg tablet, if needed, Disp: , Rfl:   •  atorvastatin (LIPITOR) 10 mg tablet, TAKE 1 TABLET BY MOUTH EVERY OTHER DAY, Disp: 45 tablet, Rfl: 4  •  calcium-vitamin D (OSCAL 500 + D) 500 mg-200 units per tablet, Take 1 tablet by mouth daily  , Disp: , Rfl:   •  losartan (COZAAR) 25 mg tablet, TAKE 2 TABLETS (50 MG TOTAL) BY MOUTH DAILY AFTER DINNER (Patient taking differently: Take 25 mg by mouth daily after dinner), Disp: 180 tablet, Rfl: 3  •  MAGNESIUM PO, Take 500 mg by mouth daily, Disp: , Rfl:   •  mycophenolate (CELLCEPT) 500 mg tablet, Take 1 tablet (500 mg total) by mouth 2 (two) times a day, Disp: 180 tablet, Rfl: 3  •  nitrofurantoin (MACRODANTIN) 50 mg capsule, TAKE 1 CAPSULE BY MOUTH EVERY DAY, Disp: 90 capsule, Rfl: 3  •  predniSONE 5 mg tablet, Take 1 tablet (5 mg total) by mouth daily, Disp: 90 tablet, Rfl: 3  •  Sodium Fluoride 5000 Sensitive 1.1-5 % PSTE, USE TWICE DAILY IN PLACE OF TOOTHPASTE, Disp: , Rfl:   •  tacrolimus (PROGRAF) 0.5 mg capsule, TAKE 1 CAPSULE (0.5 MG TOTAL) BY MOUTH  in the a.m. and in the pm 1 capsule on Monday, Wednesday and Friday and 2 capsules on Tuesday, Thursday, Saturday, and Sunday, Disp: 270 capsule, Rfl: 3  •  verapamil (CALAN-SR) 240 mg CR tablet, TAKE 1 TABLET (240 MG TOTAL) BY MOUTH DAILY IN THE EARLY MORNING (Patient taking differently: Take 120 mg by mouth 2 (two) times a day), Disp: 90 tablet, Rfl: 3  •  Calcium Carbonate 1500 (600 Ca) MG TABS, Take 600 mg by mouth daily (Patient not taking: Reported on 12/6/2023), Disp: , Rfl:     Lab, Imaging and other studies: I have personally reviewed pertinent labs.   Laboratory Results:  Results for orders placed or performed in visit on 11/11/23   PTH, Intact and Calcium   Result Value Ref Range    PTH, Intact 30 16 - 77 pg/mL    Calcium 9.4 8.6 - 10.4 mg/dL   Magnesium   Result Value Ref Range    Magnesium, Serum 1.6 1.5 - 2.5 mg/dL   Phosphorus   Result Value Ref Range    Phosphorus, Serum 3.5 2.5 - 4.5 mg/dL   Comprehensive metabolic panel   Result Value Ref Range    Glucose, Random 74 65 - 99 mg/dL    BUN 16 7 - 25 mg/dL    Creatinine 0.72 0.50 - 1.03 mg/dL    eGFR 98 > OR = 60 mL/min/1.73m2    SL AMB BUN/CREATININE RATIO SEE NOTE: 6 - 22 (calc)    Sodium 143 135 - 146 mmol/L    Potassium 3.8 3.5 - 5.3 mmol/L    Chloride 109 98 - 110 mmol/L    CO2 27 20 - 32 mmol/L    Calcium 9.4 8.6 - 10.4 mg/dL    Protein, Total 6.6 6.1 - 8.1 g/dL    Albumin 4.2 3.6 - 5.1 g/dL    Globulin 2.4 1.9 - 3.7 g/dL (calc)    Albumin/Globulin Ratio 1.8 1.0 - 2.5 (calc)    TOTAL BILIRUBIN 0.4 0.2 - 1.2 mg/dL    Alkaline Phosphatase 49 37 - 153 U/L    AST 15 10 - 35 U/L    ALT 11 6 - 29 U/L   CBC and differential   Result Value Ref Range    White Blood Cell Count 6.3 3.8 - 10.8 Thousand/uL    Red Blood Cell Count 4.66 3.80 - 5.10 Million/uL    Hemoglobin 14.9 11.7 - 15.5 g/dL    HCT 43.9 35.0 - 45.0 %    MCV 94.2 80.0 - 100.0 fL    MCH 32.0 27.0 - 33.0 pg    MCHC 33.9 32.0 - 36.0 g/dL    RDW 12.3 11.0 - 15.0 %    Platelet Count 798 817 - 400 Thousand/uL    SL AMB MPV 10.7 7.5 - 12.5 fL    Neutrophils (Absolute) 2,709 1,500 - 7,800 cells/uL    Lymphocytes (Absolute) 2,602 850 - 3,900 cells/uL    Monocytes (Absolute) 844 200 - 950 cells/uL    Eosinophils (Absolute) 82 15 - 500 cells/uL    Basophils ABS 63 0 - 200 cells/uL    Neutrophils 43 %    Lymphocytes 41.3 %    Monocytes 13.4 %    Eosinophils 1.3 %    Basophils PCT 1.0 %    Always Message     Protein, Total w/Creat, Random Urine   Result Value Ref Range    Creatinine, Urine 101 20 - 275 mg/dL    Protein/Creat Ratio 79 24 - 184 mg/g creat    Protein/Creat Ratio 0.079 0.024 - 0.184 mg/mg creat    Total Protein, Urine 8 5 - 24 mg/dL   Tacrolimus level   Result Value Ref Range    Tacrolimus, Highly Sensitive, LC/MS/MS 4.0 (L) mcg/L             Invalid input(s): "ALBUMIN"      Radiology review:   chest X-ray    Ultrasound      Portions of the record may have been created with voice recognition software. Occasional wrong word or "sound a like" substitutions may have occurred due to the inherent limitations of voice recognition software. Read the chart carefully and recognize, using context, where substitutions have occurred.

## 2024-01-28 DIAGNOSIS — N18.1 CKD (CHRONIC KIDNEY DISEASE), SYMPTOM MANAGEMENT ONLY, STAGE 1: ICD-10-CM

## 2024-01-29 RX ORDER — ALENDRONATE SODIUM 35 MG/1
TABLET ORAL
Qty: 12 TABLET | Refills: 4 | Status: SHIPPED | OUTPATIENT
Start: 2024-01-29

## 2024-01-31 ENCOUNTER — PATIENT MESSAGE (OUTPATIENT)
Dept: NEPHROLOGY | Facility: CLINIC | Age: 59
End: 2024-01-31

## 2024-01-31 DIAGNOSIS — I12.9 PARENCHYMAL RENAL HYPERTENSION, STAGE 1 THROUGH STAGE 4 OR UNSPECIFIED CHRONIC KIDNEY DISEASE: ICD-10-CM

## 2024-01-31 DIAGNOSIS — Z94.0 TRANSPLANTED KIDNEY: Primary | ICD-10-CM

## 2024-01-31 RX ORDER — LOSARTAN POTASSIUM 25 MG/1
50 TABLET ORAL
Status: CANCELLED
Start: 2024-01-31

## 2024-01-31 RX ORDER — LOSARTAN POTASSIUM 25 MG/1
50 TABLET ORAL DAILY
Start: 2024-01-31

## 2024-02-19 LAB
BUN SERPL-MCNC: 15 MG/DL (ref 7–25)
BUN/CREAT SERPL: NORMAL (CALC) (ref 6–22)
CALCIUM SERPL-MCNC: 9.7 MG/DL (ref 8.6–10.4)
CHLORIDE SERPL-SCNC: 105 MMOL/L (ref 98–110)
CO2 SERPL-SCNC: 28 MMOL/L (ref 20–32)
CREAT SERPL-MCNC: 0.81 MG/DL (ref 0.5–1.03)
GFR/BSA.PRED SERPLBLD CYS-BASED-ARV: 85 ML/MIN/1.73M2
GLUCOSE SERPL-MCNC: 72 MG/DL (ref 65–99)
POTASSIUM SERPL-SCNC: 4 MMOL/L (ref 3.5–5.3)
SODIUM SERPL-SCNC: 143 MMOL/L (ref 135–146)

## 2024-03-04 ENCOUNTER — TELEPHONE (OUTPATIENT)
Dept: GASTROENTEROLOGY | Facility: CLINIC | Age: 59
End: 2024-03-04

## 2024-03-04 NOTE — TELEPHONE ENCOUNTER
Pt is due for colon recall hx of hyperplastic polyp, hx of kidney transplant with Dr Avila. I lmom for pt to please call back to schedule. Will call again if do not hear back from her.

## 2024-03-11 NOTE — TELEPHONE ENCOUNTER
I lmom for pt to please call back to schedule a colonoscopy with Dr Avila. Will call again if do not hear back from her.

## 2024-03-13 ENCOUNTER — TELEPHONE (OUTPATIENT)
Dept: NEPHROLOGY | Facility: CLINIC | Age: 59
End: 2024-03-13

## 2024-03-13 NOTE — TELEPHONE ENCOUNTER
Left message to schedule May follow up appointment with Dr. Castillo in [place].  This is the 1st attempt.     If no appt available with Dr. Castillo in May, patient is okay to see advanced practitioner, with appointment placed on wait list as well if outside of recall time frame.

## 2024-03-15 ENCOUNTER — PREP FOR PROCEDURE (OUTPATIENT)
Age: 59
End: 2024-03-15

## 2024-03-15 ENCOUNTER — TELEPHONE (OUTPATIENT)
Age: 59
End: 2024-03-15

## 2024-03-15 DIAGNOSIS — Z12.11 SPECIAL SCREENING FOR MALIGNANT NEOPLASMS, COLON: Primary | ICD-10-CM

## 2024-03-15 NOTE — TELEPHONE ENCOUNTER
03/15/24  Screened by: Abi Montoya MA    Referring Provider 5 yr repeat    Pre- Screening:     There is no height or weight on file to calculate BMI.  Has patient been referred for a routine screening Colonoscopy? yes  Is the patient between 45-75 years old? yes      Previous Colonoscopy yes   If yes:    Date: 09/28/2018    Facility: [facility]    Reason: SCREENING          Does the patient want to see a Gastroenterologist prior to their procedure OR are they having any GI symptoms? no    Has the patient been hospitalized or had abdominal surgery in the past 6 months? no    Does the patient use supplemental oxygen? no    Does the patient take Coumadin, Lovenox, Plavix, Elliquis, Xarelto, or other blood thinning medication? no    Has the patient had a stroke, cardiac event, or stent placed in the past year? no      If patient is between 45yrs - 49yrs, please advise patient that we will have to confirm benefits & coverage with their insurance company for a routine screening colonoscopy.

## 2024-03-15 NOTE — TELEPHONE ENCOUNTER
ASC Screening    ASC Screening  BMI > than 45: No  Are you currently pregnant?: No  Do you rely on a wheelchair for mobility?: No  Do you need oxygen during the day?: No  Have you ever been informed by anesthesia that you have a difficult airway?: No  Have you been diagnosed with End Stage Renal Disease (ESRD)?: No  Are you actively on dialysis?: No  Have you been diagnosed with Pulmonary Hypertension?: No  Do you have a pacemaker or an Automatic Implantable Cardioverter Defibrillator (AICD)?: No  Have you ever had an organ transplant?: Yes  Have you had a stroke, heart attack, myocardial infarction (MI) within the last 6 months?: No  Have you ever been diagnosed with Aortic Stenosis?: No  Have you ever been diagnosed  with Congestive Heart Failure?: No  Have you ever been diagnosed with a heart valve disease?: No  Are you Diabetic?: No  If you are Diabetic, has your A1C been greater than 12 within the last six months?: N/A

## 2024-03-15 NOTE — TELEPHONE ENCOUNTER
Scheduled date of colonoscopy (as of today): 05/09/2024  Physician performing colonoscopy: [facility]  Location of colonoscopy: [facility]  Bowel prep reviewed with patient: MIRALAX/DULCOLAX  Instructions reviewed with patient by: Abi via telephone. Procedure directions sent via [employer] and email [email or web address]  Clearances: kidney transplant pt

## 2024-03-30 LAB
ALBUMIN SERPL-MCNC: 4 G/DL (ref 3.6–5.1)
ALBUMIN/GLOB SERPL: 1.7 (CALC) (ref 1–2.5)
ALP SERPL-CCNC: 49 U/L (ref 37–153)
ALT SERPL-CCNC: 11 U/L (ref 6–29)
AST SERPL-CCNC: 15 U/L (ref 10–35)
BASOPHILS # BLD AUTO: 53 CELLS/UL (ref 0–200)
BASOPHILS NFR BLD AUTO: 0.7 %
BILIRUB SERPL-MCNC: 0.4 MG/DL (ref 0.2–1.2)
BUN SERPL-MCNC: 21 MG/DL (ref 7–25)
BUN/CREAT SERPL: NORMAL (CALC) (ref 6–22)
CALCIUM SERPL-MCNC: 9.4 MG/DL (ref 8.6–10.4)
CHLORIDE SERPL-SCNC: 105 MMOL/L (ref 98–110)
CHOLEST SERPL-MCNC: 155 MG/DL
CHOLEST/HDLC SERPL: 2.1 (CALC)
CO2 SERPL-SCNC: 29 MMOL/L (ref 20–32)
CREAT SERPL-MCNC: 0.77 MG/DL (ref 0.5–1.03)
CREAT UR-MCNC: 119 MG/DL (ref 20–275)
EOSINOPHIL # BLD AUTO: 91 CELLS/UL (ref 15–500)
EOSINOPHIL NFR BLD AUTO: 1.2 %
ERYTHROCYTE [DISTWIDTH] IN BLOOD BY AUTOMATED COUNT: 12.5 % (ref 11–15)
GFR/BSA.PRED SERPLBLD CYS-BASED-ARV: 90 ML/MIN/1.73M2
GLOBULIN SER CALC-MCNC: 2.4 G/DL (CALC) (ref 1.9–3.7)
GLUCOSE SERPL-MCNC: 70 MG/DL (ref 65–99)
HCT VFR BLD AUTO: 43.4 % (ref 35–45)
HDLC SERPL-MCNC: 74 MG/DL
HGB BLD-MCNC: 14.4 G/DL (ref 11.7–15.5)
LDLC SERPL CALC-MCNC: 64 MG/DL (CALC)
LYMPHOCYTES # BLD AUTO: 3253 CELLS/UL (ref 850–3900)
LYMPHOCYTES NFR BLD AUTO: 42.8 %
MAGNESIUM SERPL-MCNC: 1.7 MG/DL (ref 1.5–2.5)
MCH RBC QN AUTO: 32.3 PG (ref 27–33)
MCHC RBC AUTO-ENTMCNC: 33.2 G/DL (ref 32–36)
MCV RBC AUTO: 97.3 FL (ref 80–100)
MONOCYTES # BLD AUTO: 844 CELLS/UL (ref 200–950)
MONOCYTES NFR BLD AUTO: 11.1 %
NEUTROPHILS # BLD AUTO: 3359 CELLS/UL (ref 1500–7800)
NEUTROPHILS NFR BLD AUTO: 44.2 %
NONHDLC SERPL-MCNC: 81 MG/DL (CALC)
PLATELET # BLD AUTO: 206 THOUSAND/UL (ref 140–400)
PMV BLD REES-ECKER: 10.4 FL (ref 7.5–12.5)
POTASSIUM SERPL-SCNC: 3.9 MMOL/L (ref 3.5–5.3)
PROT SERPL-MCNC: 6.4 G/DL (ref 6.1–8.1)
PROT UR-MCNC: 8 MG/DL (ref 5–24)
PROT/CREAT UR: 0.07 MG/MG CREAT (ref 0.02–0.18)
PROT/CREAT UR: 67 MG/G CREAT (ref 24–184)
RBC # BLD AUTO: 4.46 MILLION/UL (ref 3.8–5.1)
SODIUM SERPL-SCNC: 142 MMOL/L (ref 135–146)
TACROLIMUS BLD-MCNC: 6.2 MCG/L
TRIGL SERPL-MCNC: 89 MG/DL
WBC # BLD AUTO: 7.6 THOUSAND/UL (ref 3.8–10.8)

## 2024-04-01 ENCOUNTER — TELEPHONE (OUTPATIENT)
Dept: NEPHROLOGY | Facility: CLINIC | Age: 59
End: 2024-04-01

## 2024-04-01 NOTE — TELEPHONE ENCOUNTER
----- Message from Cody Castillo MD sent at 3/30/2024  6:19 AM EDT -----  Labs look good  ----- Message -----  From: Dayana Corey Lab Results In  Sent: 3/29/2024   7:15 PM EDT  To: Cody Castillo MD

## 2024-05-09 ENCOUNTER — ANESTHESIA EVENT (OUTPATIENT)
Dept: GASTROENTEROLOGY | Facility: HOSPITAL | Age: 59
End: 2024-05-09

## 2024-05-09 ENCOUNTER — HOSPITAL ENCOUNTER (OUTPATIENT)
Dept: GASTROENTEROLOGY | Facility: HOSPITAL | Age: 59
Setting detail: OUTPATIENT SURGERY
End: 2024-05-09
Attending: INTERNAL MEDICINE
Payer: COMMERCIAL

## 2024-05-09 ENCOUNTER — ANESTHESIA (OUTPATIENT)
Dept: GASTROENTEROLOGY | Facility: HOSPITAL | Age: 59
End: 2024-05-09

## 2024-05-09 VITALS
TEMPERATURE: 97.8 F | OXYGEN SATURATION: 99 % | HEART RATE: 59 BPM | RESPIRATION RATE: 16 BRPM | DIASTOLIC BLOOD PRESSURE: 59 MMHG | HEIGHT: 61 IN | BODY MASS INDEX: 27.11 KG/M2 | WEIGHT: 143.6 LBS | SYSTOLIC BLOOD PRESSURE: 109 MMHG

## 2024-05-09 DIAGNOSIS — Z12.11 SPECIAL SCREENING FOR MALIGNANT NEOPLASMS, COLON: ICD-10-CM

## 2024-05-09 PROBLEM — I10 HYPERTENSION: Status: ACTIVE | Noted: 2024-05-09

## 2024-05-09 PROBLEM — IMO0001 SMOKING: Status: ACTIVE | Noted: 2024-05-09

## 2024-05-09 PROBLEM — F17.200 SMOKING: Status: ACTIVE | Noted: 2024-05-09

## 2024-05-09 PROCEDURE — G0105 COLORECTAL SCRN; HI RISK IND: HCPCS | Performed by: INTERNAL MEDICINE

## 2024-05-09 RX ORDER — SODIUM CHLORIDE, SODIUM LACTATE, POTASSIUM CHLORIDE, CALCIUM CHLORIDE 600; 310; 30; 20 MG/100ML; MG/100ML; MG/100ML; MG/100ML
INJECTION, SOLUTION INTRAVENOUS CONTINUOUS PRN
Status: DISCONTINUED | OUTPATIENT
Start: 2024-05-09 | End: 2024-05-09

## 2024-05-09 RX ORDER — PROPOFOL 10 MG/ML
INJECTION, EMULSION INTRAVENOUS AS NEEDED
Status: DISCONTINUED | OUTPATIENT
Start: 2024-05-09 | End: 2024-05-09

## 2024-05-09 RX ADMIN — PROPOFOL 50 MG: 10 INJECTION, EMULSION INTRAVENOUS at 09:31

## 2024-05-09 RX ADMIN — PROPOFOL 25 MG: 10 INJECTION, EMULSION INTRAVENOUS at 09:36

## 2024-05-09 RX ADMIN — PROPOFOL 25 MG: 10 INJECTION, EMULSION INTRAVENOUS at 09:40

## 2024-05-09 RX ADMIN — PROPOFOL 25 MG: 10 INJECTION, EMULSION INTRAVENOUS at 09:38

## 2024-05-09 RX ADMIN — PROPOFOL 25 MG: 10 INJECTION, EMULSION INTRAVENOUS at 09:42

## 2024-05-09 RX ADMIN — PROPOFOL 50 MG: 10 INJECTION, EMULSION INTRAVENOUS at 09:32

## 2024-05-09 RX ADMIN — SODIUM CHLORIDE, SODIUM LACTATE, POTASSIUM CHLORIDE, AND CALCIUM CHLORIDE: .6; .31; .03; .02 INJECTION, SOLUTION INTRAVENOUS at 09:10

## 2024-05-09 RX ADMIN — PROPOFOL 25 MG: 10 INJECTION, EMULSION INTRAVENOUS at 09:45

## 2024-05-09 RX ADMIN — PROPOFOL 25 MG: 10 INJECTION, EMULSION INTRAVENOUS at 09:34

## 2024-05-09 NOTE — H&P
"History and Physical -  [facility]  Eli Pittman 59 y.o. female MRN: [id number]                  HPI: Eli Pittman is a 59 y.o. year old female who presents for ho polyps      REVIEW OF SYSTEMS: Per the HPI, and otherwise unremarkable.    Historical Information   Past Medical History:   Diagnosis Date    Chronic kidney disease     Hypertension     Renal disorder     Urinary tract infection     Vertigo      Past Surgical History:   Procedure Laterality Date    CHOLECYSTECTOMY      PARATHYROID GLAND SURGERY      TRANSPLANTATION RENAL      Kidney X2    URETERAL REIMPLANTATION OF TRANSPLANTED KIDNEY       Social History   Social History     Substance and Sexual Activity   Alcohol Use No     Social History     Substance and Sexual Activity   Drug Use No     Social History     Tobacco Use   Smoking Status Light Smoker    Current packs/day: 0.00    Types: Cigarettes   Smokeless Tobacco Never   Tobacco Comments    Stress     Family History   Problem Relation Age of Onset    No Known Problems Mother     Atrial fibrillation Father     Cancer Father         Prostate       Meds/Allergies       Current Outpatient Medications:     alendronate (FOSAMAX) 35 mg tablet    ALPRAZolam (XANAX) 0.25 mg tablet    atorvastatin (LIPITOR) 10 mg tablet    calcium-vitamin D (OSCAL 500 + D) 500 mg-200 units per tablet    losartan (COZAAR) 25 mg tablet    MAGNESIUM PO    mycophenolate (CELLCEPT) 500 mg tablet    nitrofurantoin (MACRODANTIN) 50 mg capsule    predniSONE 5 mg tablet    tacrolimus (PROGRAF) 0.5 mg capsule    verapamil (CALAN-SR) 240 mg CR tablet    Calcium Carbonate 1500 (600 Ca) MG TABS    Cequa 0.09 % SOLN    Sodium Fluoride 5000 Sensitive 1.1-5 % PSTE    No Known Allergies    Objective     /70   Pulse 72   Temp 97.6 °F (36.4 °C) (Temporal)   Resp 16   Ht 5' 1\" (1.549 m)   Wt 65.1 kg (143 lb 9.6 oz)   SpO2 96%   BMI 27.13 kg/m²       PHYSICAL EXAM    Gen: NAD  Head: NCAT  CV: RRR  CHEST: Clear  ABD: " soft, NT/ND  EXT: no edema      ASSESSMENT/PLAN:  This is a 59 y.o. year old female here for colonscopy, and she is stable and optimized for her procedure.

## 2024-05-09 NOTE — ANESTHESIA PREPROCEDURE EVALUATION
Procedure:  COLONOSCOPY    Relevant Problems   ANESTHESIA (within normal limits)      CARDIO   (+) Hypercholesterolemia   (+) Hypertension      ENDO   (+) Secondary hyperparathyroidism of renal origin (HCC)      /RENAL   (+) CKD (chronic kidney disease) stage 2, GFR 60-89 ml/min   (+) Chronic kidney disease-mineral and bone disorder   (+) Hypertensive chronic kidney disease with stage 1 through stage 4 chronic kidney disease, or unspecified chronic kidney disease      PULMONARY   (+) Smoking      Surgery/Wound/Pain   (+) Kidney transplanted      FEN/Gastrointestinal   (+) Colon polyps      Other   (+) Dyslipidemia   (+) Immunosuppression (HCC)        Physical Exam    Airway    Mallampati score: II  TM Distance: >3 FB  Neck ROM: full     Dental    upper dentures,     Cardiovascular  Rhythm: regular, Rate: normal, Cardiovascular exam normal    Pulmonary  Pulmonary exam normal Breath sounds clear to auscultation    Other Findings  post-pubertal.      Anesthesia Plan  ASA Score- 3     Anesthesia Type- IV sedation with anesthesia with ASA Monitors.         Additional Monitors:     Airway Plan:            Plan Factors-Exercise tolerance (METS): >4 METS.    Chart reviewed.   Existing labs reviewed. Patient summary reviewed.    Patient is a current smoker.  Patient instructed to abstain from smoking on day of procedure. Patient did not smoke on day of surgery.            Induction-     Postoperative Plan-     Informed Consent- Anesthetic plan and risks discussed with patient.  I personally reviewed this patient with the CRNA. Discussed and agreed on the Anesthesia Plan with the CRNA..

## 2024-05-09 NOTE — ANESTHESIA POSTPROCEDURE EVALUATION
Post-Op Assessment Note    CV Status:  Stable    Pain management: adequate       Mental Status:  Alert and awake   Hydration Status:  Euvolemic   PONV Controlled:  Controlled   Airway Patency:  Patent     Post Op Vitals Reviewed: Yes    No anethesia notable event occurred.    Staff: CRNA               BP 99/55 (05/09/24 1000)    Temp      Pulse 60 (05/09/24 1000)   Resp 21 (05/09/24 1000)    SpO2 99 % (05/09/24 1000)

## 2024-06-26 DIAGNOSIS — I12.9 PARENCHYMAL RENAL HYPERTENSION, STAGE 1 THROUGH STAGE 4 OR UNSPECIFIED CHRONIC KIDNEY DISEASE: ICD-10-CM

## 2024-06-26 DIAGNOSIS — Z79.2 NEED FOR PROPHYLACTIC ANTIBIOTIC: ICD-10-CM

## 2024-06-26 DIAGNOSIS — E78.5 DYSLIPIDEMIA: ICD-10-CM

## 2024-06-26 RX ORDER — VERAPAMIL HYDROCHLORIDE 240 MG/1
240 TABLET, FILM COATED, EXTENDED RELEASE ORAL
Qty: 90 TABLET | Refills: 1 | Status: SHIPPED | OUTPATIENT
Start: 2024-06-26

## 2024-06-26 RX ORDER — NITROFURANTOIN MACROCRYSTALS 50 MG/1
50 CAPSULE ORAL DAILY
Qty: 90 CAPSULE | Refills: 3 | Status: SHIPPED | OUTPATIENT
Start: 2024-06-26

## 2024-07-02 LAB
ALBUMIN SERPL-MCNC: 3.9 G/DL (ref 3.6–5.1)
ALBUMIN/GLOB SERPL: 1.7 (CALC) (ref 1–2.5)
ALP SERPL-CCNC: 48 U/L (ref 37–153)
ALT SERPL-CCNC: 10 U/L (ref 6–29)
AST SERPL-CCNC: 13 U/L (ref 10–35)
BASOPHILS # BLD AUTO: 49 CELLS/UL (ref 0–200)
BASOPHILS NFR BLD AUTO: 0.7 %
BILIRUB SERPL-MCNC: 0.4 MG/DL (ref 0.2–1.2)
BUN SERPL-MCNC: 12 MG/DL (ref 7–25)
BUN/CREAT SERPL: NORMAL (CALC) (ref 6–22)
CALCIUM SERPL-MCNC: 9.5 MG/DL (ref 8.6–10.4)
CHLORIDE SERPL-SCNC: 106 MMOL/L (ref 98–110)
CHOLEST SERPL-MCNC: 146 MG/DL
CHOLEST/HDLC SERPL: 2 (CALC)
CO2 SERPL-SCNC: 26 MMOL/L (ref 20–32)
CREAT SERPL-MCNC: 0.72 MG/DL (ref 0.5–1.03)
CREAT UR-MCNC: 91 MG/DL (ref 20–275)
EOSINOPHIL # BLD AUTO: 91 CELLS/UL (ref 15–500)
EOSINOPHIL NFR BLD AUTO: 1.3 %
ERYTHROCYTE [DISTWIDTH] IN BLOOD BY AUTOMATED COUNT: 12.5 % (ref 11–15)
GFR/BSA.PRED SERPLBLD CYS-BASED-ARV: 97 ML/MIN/1.73M2
GLOBULIN SER CALC-MCNC: 2.3 G/DL (CALC) (ref 1.9–3.7)
GLUCOSE SERPL-MCNC: 82 MG/DL (ref 65–99)
HCT VFR BLD AUTO: 42 % (ref 35–45)
HDLC SERPL-MCNC: 73 MG/DL
HGB BLD-MCNC: 14.1 G/DL (ref 11.7–15.5)
LDLC SERPL CALC-MCNC: 56 MG/DL (CALC)
LYMPHOCYTES # BLD AUTO: 2989 CELLS/UL (ref 850–3900)
LYMPHOCYTES NFR BLD AUTO: 42.7 %
MAGNESIUM SERPL-MCNC: 1.6 MG/DL (ref 1.5–2.5)
MCH RBC QN AUTO: 32.3 PG (ref 27–33)
MCHC RBC AUTO-ENTMCNC: 33.6 G/DL (ref 32–36)
MCV RBC AUTO: 96.3 FL (ref 80–100)
MONOCYTES # BLD AUTO: 679 CELLS/UL (ref 200–950)
MONOCYTES NFR BLD AUTO: 9.7 %
NEUTROPHILS # BLD AUTO: 3192 CELLS/UL (ref 1500–7800)
NEUTROPHILS NFR BLD AUTO: 45.6 %
NONHDLC SERPL-MCNC: 73 MG/DL (CALC)
PLATELET # BLD AUTO: 194 THOUSAND/UL (ref 140–400)
PMV BLD REES-ECKER: 10.6 FL (ref 7.5–12.5)
POTASSIUM SERPL-SCNC: 3.9 MMOL/L (ref 3.5–5.3)
PROT SERPL-MCNC: 6.2 G/DL (ref 6.1–8.1)
PROT UR-MCNC: 7 MG/DL (ref 5–24)
PROT/CREAT UR: 0.08 MG/MG CREAT (ref 0.02–0.18)
PROT/CREAT UR: 77 MG/G CREAT (ref 24–184)
RBC # BLD AUTO: 4.36 MILLION/UL (ref 3.8–5.1)
SODIUM SERPL-SCNC: 142 MMOL/L (ref 135–146)
TACROLIMUS BLD-MCNC: 5 MCG/L
TRIGL SERPL-MCNC: 85 MG/DL
WBC # BLD AUTO: 7 THOUSAND/UL (ref 3.8–10.8)

## 2024-07-10 PROBLEM — E78.00 HYPERCHOLESTEROLEMIA: Status: RESOLVED | Noted: 2018-03-08 | Resolved: 2024-07-10

## 2024-07-10 NOTE — PROGRESS NOTES
RENAL FOLLOW UP NOTE:.td    ASSESSMENT AND PLAN:  1.  CKD stage 2.:  Etiology:  Status post LRT 1997.  Baseline creatinine:  Less than 1  Current creatinine:  0.72 mg/dL at baseline  Urine protein creatinine ratio:  Insignificant at goal at 0.077 g  Tacrolimus trough level : 5.0 at goal  Recommendations:  Treat hypertension-please see below  Treat dyslipidemia-please see below  Continue immunosuppressive medications as currently prescribed  Maintain proteinuria less than 1 g or as low as possible  Avoid nephrotoxic agents such as NSAIDs, patient counseled as such  2.  Volume:  Euvolemic  3.  Hypertension:   Home blood pressure readings:    A.m.: 127/72  P.m. 122/71  Heart rate: 50-60 range     Goal blood pressure:  Less than 130/80  Recommendations:  Push nonmedical regimen including weight loss, isotonic exercise and avoidance of salt; patient counseled as such  Medication changes today:  No changes patient is at goal     4.  Electrolytes:  All acceptable     5.  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  Dyslipidemia:  Goal LDL:  Less than 100  Current lipid profile:  LDL 56/HDL 73/triglycerides 85 from 6/29/2024  Recommendations:  No changes at this time as patient is at goal     7.  Anemia:  Normal  essentially at 14.1, with a normal platelet count a 194 K     8.  Other problems:  History of urinary tract infections monitored:  Follow-up urinalysis back in April was unremarkable: UA from 4/15/2023 no evidence of urinary tract infection; ultrasound was normal transplant kidney mild increased resistive index from April 24, 2020  Patient with dizziness abnormal ECG from 02/07/2020 which was personally reviewed by Dr. Pate who felt there is no significant change.  Dr. Pate felt she had vertigo which essentially resolved.  No further cardiac testing was  recommended at that time.  We will order a bone density to make sure the Fosamax and vitamin D calcium is maintaining good bone health to be done as of 7/16/2024     GI health maintenance: Last colonoscopy 5/9/2024: Diverticulosis/hemorrhoid repeat colonoscopy in 5 years which would be March 2029    PATIENT INSTRUCTIONS:    Patient Instructions   Visit summary:  - Overall labs look great  - Please go for your bone density scan at your convenience        1. Medication changes today:  No medication changes today    2.  General instructions:  Continue to remain active and exercise        3.  Please take 1 week a blood pressure readings prior to next appointment    AS FOLLOWS  MORNING AND EVENING, SITTING AND STANDING as follows:  TAKE THE MORNING READINGS BEFORE ANY MEDICATIONS AND WHEN YOU ARE RELAXED FOR SEVERAL MINUTES  TAKE THE EVENING READINGS:  BETWEEN 7-10 P.M.; PRIOR TO ANY MEDICATIONS; AT LEAST IN OUR  FROM DINNER; AND CERTAINLY AFTER RELAXING FOR A FEW MINUTES  PLEASE INCLUDE HEART RATE WITH YOUR BLOOD PRESSURE READINGS  When taking standing readings, keep your arm supported at heart level and not dangling  Make sure you are sitting with your back supported and feet on the ground and do not cross your legs or feet  Make sure you have not taken any coffee or caffeine products or exercised or smoke cigarettes at least 30 minutes before taking your blood pressure  Then please mail these readings into the office            4.  Follow-up in 4-5 months  Please bring in 1 week a blood pressure readings morning evening, sitting and standing is outlined above  PLEASE BRING AN YOUR BLOOD PRESSURE MACHINE TO CORRELATE WITH THE OFFICE MACHINE AT THIS NEXT SCHEDULED VISIT  Please go for fasting lab work 1-2 weeks prior to your appointment      5.  General non medical recommendations:  AVOID SALT BUT NOT ADDING AN READING LABELS TO MAKE SURE THERE IS LOW-SALT IN THE FOOD THAT YOU ARE EATING  Goal is less than 2 g  of sodium intake or less than 5 g of sodium chloride intake per day    Avoid nonsteroidal anti-inflammatory drugs such as Naprosyn, ibuprofen, Aleve, Advil, Celebrex, Meloxicam (Mobic) etc.  You can use Tylenol as needed if you do not have any liver condition to be concerned about    Avoid medications such as Sudafed or decongestants and antihistamines that contained the D component which is the decongestant.  You can take antihistamines without the decongestant or D component.    Try to avoid medications such as pantoprazole or  Protonix/Nexium or Esomeprazole)/Prilosec or omeprazole/Prevacid or lansoprazole/AcipHex or Rabeprazole.  If you are able to, use Pepcid as this is safer for your kidneys.    Try to exercise at least 30 minutes 3 days a week to begin with with an ultimate goal of 5 days a week for at least 30 minutes    Please do not drink more than 2 glasses of alcohol/wine on a daily basis as this may contribute to your high blood pressure.      Maintenance recommendations:  Please see your dentist at least twice a year for oral/mouth cancer screening  Please see your dermatologist/skin doctor at least twice a year for skin cancer screening  Please follow-up with your gastroenterologist for colon cancer screening when  appropriate per their recommendations  Please see your obstetrician/gynecologist for cancer screening at least once a year per their recommendations  Please obtain the following vaccines:  Yearly flu/influenza vaccine  Pneumonia vaccine every 3-5 years:  Prevnar 13 and Pneumovax 23  Shingrix which is the new non live virus, this is against shingles/herpes zoster  Tetanus:Td/DTaP vaccine   COVID-19 vaccine:  At this time only MODERNA AND PFIZER.  DO NOT EXCEPT OR GET THE J AND J VACCINE!!  YOU SHOULD NEVER GET ANY LIVE VIRUSES:  INCLUDING ZOSTAVAX OR MMR:  SHOULD NEVER BE TAKEN    BECAUSE OF YOUR KIDNEY TRANSPLANT , YOU ARE TAKING IMMUNE SUPPRESSING MEDICATIONS WHICH MAKES YOU MORE  SUSCEPTIBLE TO NOT ONLY KIRTI/CATCHING COVID-19 VIRUS, BUT ALSO SUFFERING FROM ITS POTENTIAL SEVERE COMPLICATIONS.  BECAUSE OF THIS, I WOULD STRONGLY URGE YOU TO TRY TO AVOID CATCHING/KIRTI COVID-19 VIRUS AS MUCH AS POSSIBLE.  CERTAINLY IF YOU DEVELOPED ANY SYMPTOMS SUGGESTIVE OF COVID-19 VIRUS PLEASE CONTACT YOUR MEDICAL PHYSICIAN RIGHT AWAY, OR IF YOU ARE VERY SICK, PLEASE GO RIGHT TO THE HOSPITAL         Subjective:   There has been no hospitalizations or acute illnesses since last visit.  The patient overall is feeling well.  No fevers, chills, or cough or colds.  Good appetite and good energy  No hematuria, dysuria, voiding symptoms or foamy urine  No gastrointestinal symptoms  No cardiovascular symptoms including swelling of the legs  No headaches, dizziness or lightheadedness  Blood pressure medications:  Calan  mg twice a day  Losartan 25 mg twice a day    Renal pertinent medications:  Tacrolimus 0.5 mg daily in the morning; 0.5 mg daily in the evening except Tuesday Thursday Saturday and Sunday 1 mg in the evening  Prednisone 5 mg daily  CellCept 500 mg twice a day  Macrodantin 50 mg daily  Magnesium 500 mg daily  Vitamin D with Os-Antonio once a day  Atorvastatin 10 mg every other day  Fosamax 35 mg weekly    ROS:  See HPI, otherwise review of systems as completely reviewed with the patient are negative    Past Medical History:   Diagnosis Date    Chronic kidney disease     Hypertension 5/9/2024    Renal disorder     Urinary tract infection     Vertigo      Past Surgical History:   Procedure Laterality Date    CHOLECYSTECTOMY      PARATHYROID GLAND SURGERY      TRANSPLANTATION RENAL      Kidney X2    URETERAL REIMPLANTATION OF TRANSPLANTED KIDNEY       Family History   Problem Relation Age of Onset    No Known Problems Mother     Atrial fibrillation Father     Cancer Father         Prostate      reports that she has been smoking cigarettes. She has never used smokeless tobacco. She reports  that she does not drink alcohol and does not use drugs.    I COMPLETELY REVIEWED THE PAST MEDICAL HISTORY/PAST SURGICAL HISTORY/SOCIAL HISTORY/FAMILY HISTORY/AND MEDICATIONS  AND UPDATED ALL    Objective:     Vitals:   BP sitting on right: 126/68 with a heart rate of 64 and regular  BP standing on right:?  110/76 with a heart rate of 64 and regular    Weight (last 2 days)       Date/Time Weight    07/16/24 1425 65.8 (145)          Wt Readings from Last 3 Encounters:   07/16/24 65.8 kg (145 lb)   05/09/24 65.1 kg (143 lb 9.6 oz)   04/30/24 66.7 kg (147 lb)       Body mass index is 27.4 kg/m².    Physical Exam: General:  No acute distress  Skin:  No acute rash  Eyes:  No scleral icterus, noninjected, no discharge from eyes  ENT:  Moist mucous membranes  Neck:  Supple, no jugular venous distention, trachea is midline, no lymphadenopathy and no thyromegaly  Back   No CVAT  Chest:  Clear to auscultation and percussion, good respiratory effort  CVS:  Regular rate and rhythm without a rub, or gallops or murmurs  Abdomen:  Soft and nontender with normal bowel sounds; in particular no tenderness over the left lower quadrant transplant site  Extremities:  No cyanosis and no edema, no arthritic changes, normal range of motion  Neuro:  Grossly intact  Psych:  Alert, oriented x3 and appropriate      Medications:    Current Outpatient Medications:     alendronate (FOSAMAX) 35 mg tablet, TAKE 1 TABLET BY MOUTH BY MOUTH ONCE A WEEK, Disp: 12 tablet, Rfl: 4    ALPRAZolam (XANAX) 0.25 mg tablet, if needed, Disp: , Rfl:     atorvastatin (LIPITOR) 10 mg tablet, TAKE 1 TABLET BY MOUTH EVERY OTHER DAY, Disp: 45 tablet, Rfl: 4    calcium-vitamin D (OSCAL 500 + D) 500 mg-200 units per tablet, Take 1 tablet by mouth daily  , Disp: , Rfl:     Cequa 0.09 % SOLN, instill 1 drop in both eyes twice daily, Disp: , Rfl:     losartan (COZAAR) 25 mg tablet, Take 2 tablets (50 mg total) by mouth daily (Patient taking differently: Take 25 mg by mouth  2 (two) times a day), Disp: , Rfl:     MAGNESIUM PO, Take 500 mg by mouth daily, Disp: , Rfl:     mycophenolate (CELLCEPT) 500 mg tablet, Take 1 tablet (500 mg total) by mouth 2 (two) times a day, Disp: 180 tablet, Rfl: 3    nitrofurantoin (MACRODANTIN) 50 mg capsule, Take 1 capsule (50 mg total) by mouth daily, Disp: 90 capsule, Rfl: 3    predniSONE 5 mg tablet, Take 1 tablet (5 mg total) by mouth daily, Disp: 90 tablet, Rfl: 3    Sodium Fluoride 5000 Sensitive 1.1-5 % PSTE, USE TWICE DAILY IN PLACE OF TOOTHPASTE, Disp: , Rfl:     tacrolimus (PROGRAF) 0.5 mg capsule, TAKE 1 CAPSULE (0.5 MG TOTAL) BY MOUTH  in the a.m. and in the pm 1 capsule on Monday, Wednesday and Friday and 2 capsules on Tuesday, Thursday, Saturday, and Sunday, Disp: 270 capsule, Rfl: 3    verapamil (CALAN-SR) 240 mg CR tablet, TAKE 1 TABLET (240 MG TOTAL) BY MOUTH DAILY IN THE EARLY MORNING, Disp: 90 tablet, Rfl: 1    Calcium Carbonate 1500 (600 Ca) MG TABS, Take 600 mg by mouth daily (Patient not taking: Reported on 12/6/2023), Disp: , Rfl:     Lab, Imaging and other studies: I have personally reviewed pertinent labs.  Laboratory Results:  Results for orders placed or performed in visit on 06/29/24   Lipid Panel with Direct LDL reflex   Result Value Ref Range    Total Cholesterol 146 <200 mg/dL    HDL 73 > OR = 50 mg/dL    Triglycerides 85 <150 mg/dL    LDL Calculated 56 mg/dL (calc)    Chol HDLC Ratio 2.0 <5.0 (calc)    Non-HDL Cholesterol 73 <130 mg/dL (calc)   Magnesium   Result Value Ref Range    Magnesium, Serum 1.6 1.5 - 2.5 mg/dL   Comprehensive metabolic panel   Result Value Ref Range    Glucose, Random 82 65 - 99 mg/dL    BUN 12 7 - 25 mg/dL    Creatinine 0.72 0.50 - 1.03 mg/dL    eGFR 97 > OR = 60 mL/min/1.73m2    SL AMB BUN/CREATININE RATIO SEE NOTE: 6 - 22 (calc)    Sodium 142 135 - 146 mmol/L    Potassium 3.9 3.5 - 5.3 mmol/L    Chloride 106 98 - 110 mmol/L    CO2 26 20 - 32 mmol/L    Calcium 9.5 8.6 - 10.4 mg/dL    Protein, Total  "6.2 6.1 - 8.1 g/dL    Albumin 3.9 3.6 - 5.1 g/dL    Globulin 2.3 1.9 - 3.7 g/dL (calc)    Albumin/Globulin Ratio 1.7 1.0 - 2.5 (calc)    TOTAL BILIRUBIN 0.4 0.2 - 1.2 mg/dL    Alkaline Phosphatase 48 37 - 153 U/L    AST 13 10 - 35 U/L    ALT 10 6 - 29 U/L   CBC and differential   Result Value Ref Range    White Blood Cell Count 7.0 3.8 - 10.8 Thousand/uL    Red Blood Cell Count 4.36 3.80 - 5.10 Million/uL    Hemoglobin 14.1 11.7 - 15.5 g/dL    HCT 42.0 35.0 - 45.0 %    MCV 96.3 80.0 - 100.0 fL    MCH 32.3 27.0 - 33.0 pg    MCHC 33.6 32.0 - 36.0 g/dL    RDW 12.5 11.0 - 15.0 %    Platelet Count 194 140 - 400 Thousand/uL    SL AMB MPV 10.6 7.5 - 12.5 fL    Neutrophils (Absolute) 3,192 1,500 - 7,800 cells/uL    Lymphocytes (Absolute) 2,989 850 - 3,900 cells/uL    Monocytes (Absolute) 679 200 - 950 cells/uL    Eosinophils (Absolute) 91 15 - 500 cells/uL    Basophils ABS 49 0 - 200 cells/uL    Neutrophils 45.6 %    Lymphocytes 42.7 %    Monocytes 9.7 %    Eosinophils 1.3 %    Basophils PCT 0.7 %    Always Message     Protein, Total w/Creat, Random Urine   Result Value Ref Range    Creatinine, Urine 91 20 - 275 mg/dL    Protein/Creat Ratio 77 24 - 184 mg/g creat    Protein/Creat Ratio 0.077 0.024 - 0.184 mg/mg creat    Total Protein, Urine 7 5 - 24 mg/dL   Tacrolimus level   Result Value Ref Range    Tacrolimus, Highly Sensitive, LC/MS/MS 5.0 mcg/L             Invalid input(s): \"ALBUMIN\"      Radiology review:   chest X-ray    Ultrasound      Portions of the record may have been created with voice recognition software.  Occasional wrong word or \"sound a like\" substitutions may have occurred due to the inherent limitations of voice recognition software.  Read the chart carefully and recognize, using context, where substitutions have occurred.                    "

## 2024-07-16 ENCOUNTER — OFFICE VISIT (OUTPATIENT)
Dept: NEPHROLOGY | Facility: CLINIC | Age: 59
End: 2024-07-16
Payer: COMMERCIAL

## 2024-07-16 VITALS — HEIGHT: 61 IN | WEIGHT: 145 LBS | BODY MASS INDEX: 27.38 KG/M2

## 2024-07-16 DIAGNOSIS — N25.81 SECONDARY HYPERPARATHYROIDISM OF RENAL ORIGIN (HCC): ICD-10-CM

## 2024-07-16 DIAGNOSIS — E83.9 CHRONIC KIDNEY DISEASE-MINERAL AND BONE DISORDER: ICD-10-CM

## 2024-07-16 DIAGNOSIS — Z94.0 KIDNEY TRANSPLANTED: ICD-10-CM

## 2024-07-16 DIAGNOSIS — N18.2 CKD (CHRONIC KIDNEY DISEASE) STAGE 2, GFR 60-89 ML/MIN: ICD-10-CM

## 2024-07-16 DIAGNOSIS — E78.5 DYSLIPIDEMIA: ICD-10-CM

## 2024-07-16 DIAGNOSIS — I12.9 HYPERTENSIVE CHRONIC KIDNEY DISEASE WITH STAGE 1 THROUGH STAGE 4 CHRONIC KIDNEY DISEASE, OR UNSPECIFIED CHRONIC KIDNEY DISEASE: Primary | ICD-10-CM

## 2024-07-16 DIAGNOSIS — D84.9 IMMUNOSUPPRESSION (HCC): ICD-10-CM

## 2024-07-16 DIAGNOSIS — M89.9 CHRONIC KIDNEY DISEASE-MINERAL AND BONE DISORDER: ICD-10-CM

## 2024-07-16 DIAGNOSIS — N18.9 CHRONIC KIDNEY DISEASE-MINERAL AND BONE DISORDER: ICD-10-CM

## 2024-07-16 PROCEDURE — 99214 OFFICE O/P EST MOD 30 MIN: CPT | Performed by: INTERNAL MEDICINE

## 2024-07-16 NOTE — PATIENT INSTRUCTIONS
Visit summary:  - Overall labs look great  - Please go for your bone density scan at your convenience        1. Medication changes today:  No medication changes today    2.  General instructions:  Continue to remain active and exercise        3.  Please take 1 week a blood pressure readings prior to next appointment    AS FOLLOWS  MORNING AND EVENING, SITTING AND STANDING as follows:  TAKE THE MORNING READINGS BEFORE ANY MEDICATIONS AND WHEN YOU ARE RELAXED FOR SEVERAL MINUTES  TAKE THE EVENING READINGS:  BETWEEN 7-10 P.M.; PRIOR TO ANY MEDICATIONS; AT LEAST IN OUR  FROM DINNER; AND CERTAINLY AFTER RELAXING FOR A FEW MINUTES  PLEASE INCLUDE HEART RATE WITH YOUR BLOOD PRESSURE READINGS  When taking standing readings, keep your arm supported at heart level and not dangling  Make sure you are sitting with your back supported and feet on the ground and do not cross your legs or feet  Make sure you have not taken any coffee or caffeine products or exercised or smoke cigarettes at least 30 minutes before taking your blood pressure  Then please mail these readings into the office            4.  Follow-up in 4-5 months  Please bring in 1 week a blood pressure readings morning evening, sitting and standing is outlined above  PLEASE BRING AN YOUR BLOOD PRESSURE MACHINE TO CORRELATE WITH THE OFFICE MACHINE AT THIS NEXT SCHEDULED VISIT  Please go for fasting lab work 1-2 weeks prior to your appointment      5.  General non medical recommendations:  AVOID SALT BUT NOT ADDING AN READING LABELS TO MAKE SURE THERE IS LOW-SALT IN THE FOOD THAT YOU ARE EATING  Goal is less than 2 g of sodium intake or less than 5 g of sodium chloride intake per day    Avoid nonsteroidal anti-inflammatory drugs such as Naprosyn, ibuprofen, Aleve, Advil, Celebrex, Meloxicam (Mobic) etc.  You can use Tylenol as needed if you do not have any liver condition to be concerned about    Avoid medications such as Sudafed or decongestants and  antihistamines that contained the D component which is the decongestant.  You can take antihistamines without the decongestant or D component.    Try to avoid medications such as pantoprazole or  Protonix/Nexium or Esomeprazole)/Prilosec or omeprazole/Prevacid or lansoprazole/AcipHex or Rabeprazole.  If you are able to, use Pepcid as this is safer for your kidneys.    Try to exercise at least 30 minutes 3 days a week to begin with with an ultimate goal of 5 days a week for at least 30 minutes    Please do not drink more than 2 glasses of alcohol/wine on a daily basis as this may contribute to your high blood pressure.      Maintenance recommendations:  Please see your dentist at least twice a year for oral/mouth cancer screening  Please see your dermatologist/skin doctor at least twice a year for skin cancer screening  Please follow-up with your gastroenterologist for colon cancer screening when  appropriate per their recommendations  Please see your obstetrician/gynecologist for cancer screening at least once a year per their recommendations  Please obtain the following vaccines:  Yearly flu/influenza vaccine  Pneumonia vaccine every 3-5 years:  Prevnar 13 and Pneumovax 23  Shingrix which is the new non live virus, this is against shingles/herpes zoster  Tetanus:Td/DTaP vaccine   COVID-19 vaccine:  At this time only MODERNA AND PFIZER.  DO NOT EXCEPT OR GET THE J AND J VACCINE!!  YOU SHOULD NEVER GET ANY LIVE VIRUSES:  INCLUDING ZOSTAVAX OR MMR:  SHOULD NEVER BE TAKEN    BECAUSE OF YOUR KIDNEY TRANSPLANT , YOU ARE TAKING IMMUNE SUPPRESSING MEDICATIONS WHICH MAKES YOU MORE SUSCEPTIBLE TO NOT ONLY KIRTI/CATCHING COVID-19 VIRUS, BUT ALSO SUFFERING FROM ITS POTENTIAL SEVERE COMPLICATIONS.  BECAUSE OF THIS, I WOULD STRONGLY URGE YOU TO TRY TO AVOID CATCHING/KIRTI COVID-19 VIRUS AS MUCH AS POSSIBLE.  CERTAINLY IF YOU DEVELOPED ANY SYMPTOMS SUGGESTIVE OF COVID-19 VIRUS PLEASE CONTACT YOUR MEDICAL PHYSICIAN  RIGHT AWAY, OR IF YOU ARE VERY SICK, PLEASE GO RIGHT TO THE HOSPITAL

## 2024-07-16 NOTE — LETTER
July 16, 2024     Luis Fernando Gaming MD  [address]  [address]  [facility] [address]    Patient: Eli Pittman   YOB: 1965   Date of Visit: 7/16/2024       Dear Dr. Gaming:    Thank you for referring Eli Pittman to me for evaluation. Below are my notes for this consultation.    If you have questions, please do not hesitate to call me. I look forward to following your patient along with you.         Sincerely,        Cody Castillo MD        CC: No Recipients    Cody Castillo MD  7/16/2024  2:43 PM  Sign when Signing Visit  RENAL FOLLOW UP NOTE:.td    ASSESSMENT AND PLAN:  1.  CKD stage 2.:  Etiology:  Status post LRT 1997.  Baseline creatinine:  Less than 1  Current creatinine:  0.72 mg/dL at baseline  Urine protein creatinine ratio:  Insignificant at goal at 0.077 g  Tacrolimus trough level : 5.0 at goal  Recommendations:  Treat hypertension-please see below  Treat dyslipidemia-please see below  Continue immunosuppressive medications as currently prescribed  Maintain proteinuria less than 1 g or as low as possible  Avoid nephrotoxic agents such as NSAIDs, patient counseled as such  2.  Volume:  Euvolemic  3.  Hypertension:   Home blood pressure readings:    A.m.: 127/72  P.m. 122/71  Heart rate: 50-60 range     Goal blood pressure:  Less than 130/80  Recommendations:  Push nonmedical regimen including weight loss, isotonic exercise and avoidance of salt; patient counseled as such  Medication changes today:  No changes patient is at goal     4.  Electrolytes:  All acceptable     5.  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  Dyslipidemia:  Goal LDL:  Less than 100  Current lipid profile:  LDL 56/HDL 73/triglycerides 85 from 6/29/2024  Recommendations:  No changes at this time as patient is at goal     7.  Anemia:  Normal  essentially at  14.1, with a normal platelet count a 194 K     8.  Other problems:  History of urinary tract infections monitored:  Follow-up urinalysis back in April was unremarkable: UA from 4/15/2023 no evidence of urinary tract infection; ultrasound was normal transplant kidney mild increased resistive index from April 24, 2020  Patient with dizziness abnormal ECG from 02/07/2020 which was personally reviewed by Dr. Pate who felt there is no significant change.  Dr. Pate felt she had vertigo which essentially resolved.  No further cardiac testing was recommended at that time.  We will order a bone density to make sure the Fosamax and vitamin D calcium is maintaining good bone health to be done as of 7/16/2024     GI health maintenance: Last colonoscopy 5/9/2024: Diverticulosis/hemorrhoid repeat colonoscopy in 5 years which would be March 2029    PATIENT INSTRUCTIONS:    Patient Instructions   Visit summary:  - Overall labs look great  - Please go for your bone density scan at your convenience        1. Medication changes today:  No medication changes today    2.  General instructions:  Continue to remain active and exercise        3.  Please take 1 week a blood pressure readings prior to next appointment    AS FOLLOWS  MORNING AND EVENING, SITTING AND STANDING as follows:  TAKE THE MORNING READINGS BEFORE ANY MEDICATIONS AND WHEN YOU ARE RELAXED FOR SEVERAL MINUTES  TAKE THE EVENING READINGS:  BETWEEN 7-10 P.M.; PRIOR TO ANY MEDICATIONS; AT LEAST IN OUR  FROM DINNER; AND CERTAINLY AFTER RELAXING FOR A FEW MINUTES  PLEASE INCLUDE HEART RATE WITH YOUR BLOOD PRESSURE READINGS  When taking standing readings, keep your arm supported at heart level and not dangling  Make sure you are sitting with your back supported and feet on the ground and do not cross your legs or feet  Make sure you have not taken any coffee or caffeine products or exercised or smoke cigarettes at least 30 minutes before taking your blood pressure  Then  please mail these readings into the office            4.  Follow-up in 4-5 months  Please bring in 1 week a blood pressure readings morning evening, sitting and standing is outlined above  PLEASE BRING AN YOUR BLOOD PRESSURE MACHINE TO CORRELATE WITH THE OFFICE MACHINE AT THIS NEXT SCHEDULED VISIT  Please go for fasting lab work 1-2 weeks prior to your appointment      5.  General non medical recommendations:  AVOID SALT BUT NOT ADDING AN READING LABELS TO MAKE SURE THERE IS LOW-SALT IN THE FOOD THAT YOU ARE EATING  Goal is less than 2 g of sodium intake or less than 5 g of sodium chloride intake per day    Avoid nonsteroidal anti-inflammatory drugs such as Naprosyn, ibuprofen, Aleve, Advil, Celebrex, Meloxicam (Mobic) etc.  You can use Tylenol as needed if you do not have any liver condition to be concerned about    Avoid medications such as Sudafed or decongestants and antihistamines that contained the D component which is the decongestant.  You can take antihistamines without the decongestant or D component.    Try to avoid medications such as pantoprazole or  Protonix/Nexium or Esomeprazole)/Prilosec or omeprazole/Prevacid or lansoprazole/AcipHex or Rabeprazole.  If you are able to, use Pepcid as this is safer for your kidneys.    Try to exercise at least 30 minutes 3 days a week to begin with with an ultimate goal of 5 days a week for at least 30 minutes    Please do not drink more than 2 glasses of alcohol/wine on a daily basis as this may contribute to your high blood pressure.      Maintenance recommendations:  Please see your dentist at least twice a year for oral/mouth cancer screening  Please see your dermatologist/skin doctor at least twice a year for skin cancer screening  Please follow-up with your gastroenterologist for colon cancer screening when  appropriate per their recommendations  Please see your obstetrician/gynecologist for cancer screening at least once a year per their  recommendations  Please obtain the following vaccines:  Yearly flu/influenza vaccine  Pneumonia vaccine every 3-5 years:  Prevnar 13 and Pneumovax 23  Shingrix which is the new non live virus, this is against shingles/herpes zoster  Tetanus:Td/DTaP vaccine   COVID-19 vaccine:  At this time only MODERNA AND PFIZER.  DO NOT EXCEPT OR GET THE J AND J VACCINE!!  YOU SHOULD NEVER GET ANY LIVE VIRUSES:  INCLUDING ZOSTAVAX OR MMR:  SHOULD NEVER BE TAKEN    BECAUSE OF YOUR KIDNEY TRANSPLANT , YOU ARE TAKING IMMUNE SUPPRESSING MEDICATIONS WHICH MAKES YOU MORE SUSCEPTIBLE TO NOT ONLY KIRTI/CATCHING COVID-19 VIRUS, BUT ALSO SUFFERING FROM ITS POTENTIAL SEVERE COMPLICATIONS.  BECAUSE OF THIS, I WOULD STRONGLY URGE YOU TO TRY TO AVOID CATCHING/KIRTI COVID-19 VIRUS AS MUCH AS POSSIBLE.  CERTAINLY IF YOU DEVELOPED ANY SYMPTOMS SUGGESTIVE OF COVID-19 VIRUS PLEASE CONTACT YOUR MEDICAL PHYSICIAN RIGHT AWAY, OR IF YOU ARE VERY SICK, PLEASE GO RIGHT TO THE HOSPITAL         Subjective:   There has been no hospitalizations or acute illnesses since last visit.  The patient overall is feeling well.  No fevers, chills, or cough or colds.  Good appetite and good energy  No hematuria, dysuria, voiding symptoms or foamy urine  No gastrointestinal symptoms  No cardiovascular symptoms including swelling of the legs  No headaches, dizziness or lightheadedness  Blood pressure medications:  Calan  mg twice a day  Losartan 25 mg twice a day    Renal pertinent medications:  Tacrolimus 0.5 mg daily in the morning; 0.5 mg daily in the evening except Tuesday Thursday Saturday and Sunday 1 mg in the evening  Prednisone 5 mg daily  CellCept 500 mg twice a day  Macrodantin 50 mg daily  Magnesium 500 mg daily  Vitamin D with Os-Antonio once a day  Atorvastatin 10 mg every other day  Fosamax 35 mg weekly    ROS:  See HPI, otherwise review of systems as completely reviewed with the patient are negative    Past Medical History:   Diagnosis Date    • Chronic kidney disease    • Hypertension 5/9/2024   • Renal disorder    • Urinary tract infection    • Vertigo      Past Surgical History:   Procedure Laterality Date   • CHOLECYSTECTOMY     • PARATHYROID GLAND SURGERY     • TRANSPLANTATION RENAL      Kidney X2   • URETERAL REIMPLANTATION OF TRANSPLANTED KIDNEY       Family History   Problem Relation Age of Onset   • No Known Problems Mother    • Atrial fibrillation Father    • Cancer Father         Prostate      reports that she has been smoking cigarettes. She has never used smokeless tobacco. She reports that she does not drink alcohol and does not use drugs.    I COMPLETELY REVIEWED THE PAST MEDICAL HISTORY/PAST SURGICAL HISTORY/SOCIAL HISTORY/FAMILY HISTORY/AND MEDICATIONS  AND UPDATED ALL    Objective:     Vitals:   BP sitting on right: 126/68 with a heart rate of 64 and regular  BP standing on right:?  110/76 with a heart rate of 64 and regular    Weight (last 2 days)       Date/Time Weight    07/16/24 1425 65.8 (145)          Wt Readings from Last 3 Encounters:   07/16/24 65.8 kg (145 lb)   05/09/24 65.1 kg (143 lb 9.6 oz)   04/30/24 66.7 kg (147 lb)       Body mass index is 27.4 kg/m².    Physical Exam: General:  No acute distress  Skin:  No acute rash  Eyes:  No scleral icterus, noninjected, no discharge from eyes  ENT:  Moist mucous membranes  Neck:  Supple, no jugular venous distention, trachea is midline, no lymphadenopathy and no thyromegaly  Back   No CVAT  Chest:  Clear to auscultation and percussion, good respiratory effort  CVS:  Regular rate and rhythm without a rub, or gallops or murmurs  Abdomen:  Soft and nontender with normal bowel sounds; in particular no tenderness over the left lower quadrant transplant site  Extremities:  No cyanosis and no edema, no arthritic changes, normal range of motion  Neuro:  Grossly intact  Psych:  Alert, oriented x3 and appropriate      Medications:    Current Outpatient Medications:   •  alendronate  (FOSAMAX) 35 mg tablet, TAKE 1 TABLET BY MOUTH BY MOUTH ONCE A WEEK, Disp: 12 tablet, Rfl: 4  •  ALPRAZolam (XANAX) 0.25 mg tablet, if needed, Disp: , Rfl:   •  atorvastatin (LIPITOR) 10 mg tablet, TAKE 1 TABLET BY MOUTH EVERY OTHER DAY, Disp: 45 tablet, Rfl: 4  •  calcium-vitamin D (OSCAL 500 + D) 500 mg-200 units per tablet, Take 1 tablet by mouth daily  , Disp: , Rfl:   •  Cequa 0.09 % SOLN, instill 1 drop in both eyes twice daily, Disp: , Rfl:   •  losartan (COZAAR) 25 mg tablet, Take 2 tablets (50 mg total) by mouth daily (Patient taking differently: Take 25 mg by mouth 2 (two) times a day), Disp: , Rfl:   •  MAGNESIUM PO, Take 500 mg by mouth daily, Disp: , Rfl:   •  mycophenolate (CELLCEPT) 500 mg tablet, Take 1 tablet (500 mg total) by mouth 2 (two) times a day, Disp: 180 tablet, Rfl: 3  •  nitrofurantoin (MACRODANTIN) 50 mg capsule, Take 1 capsule (50 mg total) by mouth daily, Disp: 90 capsule, Rfl: 3  •  predniSONE 5 mg tablet, Take 1 tablet (5 mg total) by mouth daily, Disp: 90 tablet, Rfl: 3  •  Sodium Fluoride 5000 Sensitive 1.1-5 % PSTE, USE TWICE DAILY IN PLACE OF TOOTHPASTE, Disp: , Rfl:   •  tacrolimus (PROGRAF) 0.5 mg capsule, TAKE 1 CAPSULE (0.5 MG TOTAL) BY MOUTH  in the a.m. and in the pm 1 capsule on Monday, Wednesday and Friday and 2 capsules on Tuesday, Thursday, Saturday, and Sunday, Disp: 270 capsule, Rfl: 3  •  verapamil (CALAN-SR) 240 mg CR tablet, TAKE 1 TABLET (240 MG TOTAL) BY MOUTH DAILY IN THE EARLY MORNING, Disp: 90 tablet, Rfl: 1  •  Calcium Carbonate 1500 (600 Ca) MG TABS, Take 600 mg by mouth daily (Patient not taking: Reported on 12/6/2023), Disp: , Rfl:     Lab, Imaging and other studies: I have personally reviewed pertinent labs.  Laboratory Results:  Results for orders placed or performed in visit on 06/29/24   Lipid Panel with Direct LDL reflex   Result Value Ref Range    Total Cholesterol 146 <200 mg/dL    HDL 73 > OR = 50 mg/dL    Triglycerides 85 <150 mg/dL    LDL  Calculated 56 mg/dL (calc)    Chol HDLC Ratio 2.0 <5.0 (calc)    Non-HDL Cholesterol 73 <130 mg/dL (calc)   Magnesium   Result Value Ref Range    Magnesium, Serum 1.6 1.5 - 2.5 mg/dL   Comprehensive metabolic panel   Result Value Ref Range    Glucose, Random 82 65 - 99 mg/dL    BUN 12 7 - 25 mg/dL    Creatinine 0.72 0.50 - 1.03 mg/dL    eGFR 97 > OR = 60 mL/min/1.73m2    SL AMB BUN/CREATININE RATIO SEE NOTE: 6 - 22 (calc)    Sodium 142 135 - 146 mmol/L    Potassium 3.9 3.5 - 5.3 mmol/L    Chloride 106 98 - 110 mmol/L    CO2 26 20 - 32 mmol/L    Calcium 9.5 8.6 - 10.4 mg/dL    Protein, Total 6.2 6.1 - 8.1 g/dL    Albumin 3.9 3.6 - 5.1 g/dL    Globulin 2.3 1.9 - 3.7 g/dL (calc)    Albumin/Globulin Ratio 1.7 1.0 - 2.5 (calc)    TOTAL BILIRUBIN 0.4 0.2 - 1.2 mg/dL    Alkaline Phosphatase 48 37 - 153 U/L    AST 13 10 - 35 U/L    ALT 10 6 - 29 U/L   CBC and differential   Result Value Ref Range    White Blood Cell Count 7.0 3.8 - 10.8 Thousand/uL    Red Blood Cell Count 4.36 3.80 - 5.10 Million/uL    Hemoglobin 14.1 11.7 - 15.5 g/dL    HCT 42.0 35.0 - 45.0 %    MCV 96.3 80.0 - 100.0 fL    MCH 32.3 27.0 - 33.0 pg    MCHC 33.6 32.0 - 36.0 g/dL    RDW 12.5 11.0 - 15.0 %    Platelet Count 194 140 - 400 Thousand/uL    SL AMB MPV 10.6 7.5 - 12.5 fL    Neutrophils (Absolute) 3,192 1,500 - 7,800 cells/uL    Lymphocytes (Absolute) 2,989 850 - 3,900 cells/uL    Monocytes (Absolute) 679 200 - 950 cells/uL    Eosinophils (Absolute) 91 15 - 500 cells/uL    Basophils ABS 49 0 - 200 cells/uL    Neutrophils 45.6 %    Lymphocytes 42.7 %    Monocytes 9.7 %    Eosinophils 1.3 %    Basophils PCT 0.7 %    Always Message     Protein, Total w/Creat, Random Urine   Result Value Ref Range    Creatinine, Urine 91 20 - 275 mg/dL    Protein/Creat Ratio 77 24 - 184 mg/g creat    Protein/Creat Ratio 0.077 0.024 - 0.184 mg/mg creat    Total Protein, Urine 7 5 - 24 mg/dL   Tacrolimus level   Result Value Ref Range    Tacrolimus, Highly Sensitive,  "LC/MS/MS 5.0 mcg/L             Invalid input(s): \"ALBUMIN\"      Radiology review:   chest X-ray    Ultrasound      Portions of the record may have been created with voice recognition software.  Occasional wrong word or \"sound a like\" substitutions may have occurred due to the inherent limitations of voice recognition software.  Read the chart carefully and recognize, using context, where substitutions have occurred.                    "

## 2024-10-11 DIAGNOSIS — E78.5 DYSLIPIDEMIA: ICD-10-CM

## 2024-10-11 DIAGNOSIS — I12.9 PARENCHYMAL RENAL HYPERTENSION, STAGE 1 THROUGH STAGE 4 OR UNSPECIFIED CHRONIC KIDNEY DISEASE: ICD-10-CM

## 2024-10-11 RX ORDER — ATORVASTATIN CALCIUM 10 MG/1
TABLET, FILM COATED ORAL
Qty: 45 TABLET | Refills: 2 | Status: SHIPPED | OUTPATIENT
Start: 2024-10-11

## 2024-10-16 DIAGNOSIS — F41.9 ANXIETY: Primary | ICD-10-CM

## 2024-10-16 RX ORDER — ALPRAZOLAM 0.25 MG/1
0.25 TABLET ORAL DAILY PRN
Qty: 30 TABLET | Refills: 0 | Status: SHIPPED | OUTPATIENT
Start: 2024-10-16

## 2024-10-26 DIAGNOSIS — Z94.0 TRANSPLANTED KIDNEY: ICD-10-CM

## 2024-10-28 DIAGNOSIS — Z94.0 TRANSPLANTED KIDNEY: ICD-10-CM

## 2024-10-28 RX ORDER — TACROLIMUS 0.5 MG/1
CAPSULE ORAL
Qty: 230 CAPSULE | Refills: 3 | Status: SHIPPED | OUTPATIENT
Start: 2024-10-28 | End: 2024-10-29

## 2024-10-28 NOTE — TELEPHONE ENCOUNTER
Message left for pt asking for a call back to confirm directions but it looks like no changes have been made lately.

## 2024-10-29 DIAGNOSIS — E78.5 DYSLIPIDEMIA: ICD-10-CM

## 2024-10-29 DIAGNOSIS — Z94.0 KIDNEY TRANSPLANTED: ICD-10-CM

## 2024-10-29 DIAGNOSIS — I12.9 PARENCHYMAL RENAL HYPERTENSION, STAGE 1 THROUGH STAGE 4 OR UNSPECIFIED CHRONIC KIDNEY DISEASE: ICD-10-CM

## 2024-10-29 RX ORDER — PREDNISONE 5 MG/1
5 TABLET ORAL DAILY
Qty: 90 TABLET | Refills: 3 | Status: SHIPPED | OUTPATIENT
Start: 2024-10-29

## 2024-10-29 RX ORDER — MYCOPHENOLATE MOFETIL 500 MG/1
500 TABLET ORAL 2 TIMES DAILY
Qty: 180 TABLET | Refills: 3 | Status: SHIPPED | OUTPATIENT
Start: 2024-10-29

## 2024-10-29 RX ORDER — TACROLIMUS 0.5 MG/1
CAPSULE ORAL
Qty: 230 CAPSULE | Refills: 3 | Status: SHIPPED | OUTPATIENT
Start: 2024-10-29

## 2024-10-29 NOTE — TELEPHONE ENCOUNTER
Reason for call:   [x] Refill   [] Prior Auth  [] Other:     Office:   [x] Specialty/Provider - neph / Castillo    Medication:   Mycophenolate 500mg bid  #180  Prednisone 5mg qd  #90    Pharmacy: [facility]/pharmacy #[id number] - [place] - [address]     Does the patient have enough for 3 days?   [x] Yes   [] No - Send as HP to POD

## 2024-11-03 DIAGNOSIS — I12.9 PARENCHYMAL RENAL HYPERTENSION, STAGE 1 THROUGH STAGE 4 OR UNSPECIFIED CHRONIC KIDNEY DISEASE: ICD-10-CM

## 2024-11-04 RX ORDER — LOSARTAN POTASSIUM 25 MG/1
TABLET ORAL
Qty: 180 TABLET | Refills: 1 | Status: SHIPPED | OUTPATIENT
Start: 2024-11-04

## 2024-11-20 ENCOUNTER — TELEPHONE (OUTPATIENT)
Dept: NEPHROLOGY | Facility: CLINIC | Age: 59
End: 2024-11-20

## 2024-11-20 NOTE — TELEPHONE ENCOUNTER
LM for pt offering sooner appt w/Dr. Castillo 11/25 in any available opening. Pt needs to complete labs

## 2024-11-22 ENCOUNTER — HOSPITAL ENCOUNTER (OUTPATIENT)
Facility: HOSPITAL | Age: 59
Discharge: HOME | End: 2024-11-22
Attending: INTERNAL MEDICINE
Payer: COMMERCIAL

## 2024-11-22 VITALS — BODY MASS INDEX: 29.06 KG/M2 | HEIGHT: 60 IN | WEIGHT: 148 LBS

## 2024-11-22 DIAGNOSIS — E83.9 CHRONIC KIDNEY DISEASE-MINERAL AND BONE DISORDER: ICD-10-CM

## 2024-11-22 DIAGNOSIS — M89.9 CHRONIC KIDNEY DISEASE-MINERAL AND BONE DISORDER: ICD-10-CM

## 2024-11-22 DIAGNOSIS — E78.5 DYSLIPIDEMIA: ICD-10-CM

## 2024-11-22 DIAGNOSIS — I12.9 HYPERTENSIVE CHRONIC KIDNEY DISEASE WITH STAGE 1 THROUGH STAGE 4 CHRONIC KIDNEY DISEASE, OR UNSPECIFIED CHRONIC KIDNEY DISEASE: ICD-10-CM

## 2024-11-22 DIAGNOSIS — N18.9 CHRONIC KIDNEY DISEASE-MINERAL AND BONE DISORDER: ICD-10-CM

## 2024-11-22 DIAGNOSIS — Z94.0 KIDNEY TRANSPLANTED: ICD-10-CM

## 2024-11-22 DIAGNOSIS — D84.9 IMMUNOSUPPRESSION (HCC): ICD-10-CM

## 2024-11-22 DIAGNOSIS — N18.2 CKD (CHRONIC KIDNEY DISEASE) STAGE 2, GFR 60-89 ML/MIN: ICD-10-CM

## 2024-11-22 DIAGNOSIS — N25.81 SECONDARY HYPERPARATHYROIDISM OF RENAL ORIGIN (HCC): ICD-10-CM

## 2024-11-22 PROCEDURE — 77080 DXA BONE DENSITY AXIAL: CPT

## 2024-11-26 LAB
ALBUMIN SERPL-MCNC: 3.9 G/DL (ref 3.6–5.1)
ALBUMIN/GLOB SERPL: 1.4 (CALC) (ref 1–2.5)
ALP SERPL-CCNC: 55 U/L (ref 37–153)
ALT SERPL-CCNC: 11 U/L (ref 6–29)
AST SERPL-CCNC: 14 U/L (ref 10–35)
BILIRUB SERPL-MCNC: 0.6 MG/DL (ref 0.2–1.2)
BUN SERPL-MCNC: 15 MG/DL (ref 7–25)
BUN/CREAT SERPL: NORMAL (CALC) (ref 6–22)
CALCIUM SERPL-MCNC: 9.6 MG/DL (ref 8.6–10.4)
CHLORIDE SERPL-SCNC: 106 MMOL/L (ref 98–110)
CO2 SERPL-SCNC: 31 MMOL/L (ref 20–32)
CREAT SERPL-MCNC: 0.73 MG/DL (ref 0.5–1.03)
CREAT UR-MCNC: 86 MG/DL (ref 20–275)
ERYTHROCYTE [DISTWIDTH] IN BLOOD BY AUTOMATED COUNT: 12.5 % (ref 11–15)
GFR/BSA.PRED SERPLBLD CYS-BASED-ARV: 96 ML/MIN/1.73M2
GLOBULIN SER CALC-MCNC: 2.7 G/DL (CALC) (ref 1.9–3.7)
GLUCOSE SERPL-MCNC: 69 MG/DL (ref 65–99)
HCT VFR BLD AUTO: 45.7 % (ref 35–45)
HGB BLD-MCNC: 14.9 G/DL (ref 11.7–15.5)
MAGNESIUM SERPL-MCNC: 1.7 MG/DL (ref 1.5–2.5)
MCH RBC QN AUTO: 31.6 PG (ref 27–33)
MCHC RBC AUTO-ENTMCNC: 32.6 G/DL (ref 32–36)
MCV RBC AUTO: 96.8 FL (ref 80–100)
PLATELET # BLD AUTO: 230 THOUSAND/UL (ref 140–400)
PMV BLD REES-ECKER: 10.3 FL (ref 7.5–12.5)
POTASSIUM SERPL-SCNC: 4.3 MMOL/L (ref 3.5–5.3)
PROT SERPL-MCNC: 6.6 G/DL (ref 6.1–8.1)
PROT UR-MCNC: 8 MG/DL (ref 5–24)
PROT/CREAT UR: 0.09 MG/MG CREAT (ref 0.02–0.18)
PROT/CREAT UR: 93 MG/G CREAT (ref 24–184)
RBC # BLD AUTO: 4.72 MILLION/UL (ref 3.8–5.1)
SODIUM SERPL-SCNC: 143 MMOL/L (ref 135–146)
TACROLIMUS BLD-MCNC: 5.8 MCG/L
WBC # BLD AUTO: 7.2 THOUSAND/UL (ref 3.8–10.8)

## 2024-12-08 NOTE — PROGRESS NOTES
RENAL FOLLOW UP NOTE:.td    ASSESSMENT AND PLAN:  1.  CKD stage 2.:  Etiology:  Status post LRT 1997.  Baseline creatinine:  Less than 1  Current creatinine:  0.73 mg/dL at baseline as of 11/23/2024  Urine protein creatinine ratio:  Insignificant   Tacrolimus trough level : 5.8 at goal from 11/23/2024  Recommendations:  Treat hypertension-please see below  Treat dyslipidemia-please see below  Continue immunosuppressive medications as currently prescribed  Maintain proteinuria less than 1 g or as low as possible  Avoid nephrotoxic agents such as NSAIDs, patient counseled as such  2.  Volume:  Euvolemic  3.  Hypertension:   Home blood pressure readings:    A.m.: 123/73, standing 110/72  P.m. 130/74, standing 119/72  Heart rate: 70 range     Goal blood pressure:  Less than 130/80  Recommendations:  Push nonmedical regimen including weight loss, isotonic exercise and avoidance of salt; patient counseled as such  Medication changes today:  No changes patient is at goal     4.  Electrolytes:  All acceptable     5.  Mineral bone disorder:  Secondary to CKD  Calcium/magnesium/phosphorus: Magnesium borderline low at 1.7 continue on oral supplement  PTH intact level: 30 which is acceptable  Vitamin-D:  44 at goal      6.  Dyslipidemia:  Goal LDL:  Less than 100  Current lipid profile:  LDL 56/HDL 73/triglycerides 85 from 6/29/2024  Recommendations:  No changes at this time as patient is at goal     7.  Anemia:  Normal at 14.9 with normal platelet count     8.  Other problems:  History of urinary tract infections monitored:  Follow-up urinalysis back in April was unremarkable: UA from 4/15/2023 no evidence of urinary tract infection; ultrasound was normal transplant kidney mild increased resistive index from April 24, 2020  Patient with dizziness abnormal ECG from 02/07/2020 which was personally reviewed by Dr. Pate who felt there is no significant change.  Dr. Pate felt she had vertigo which essentially resolved.  No further  cardiac testing was recommended at that time.  Osteoporosis on Fosamax vitamin D and calcium I would defer to primary physician for anything further at this juncture      From Dr. Gaming     I looked at her dexascan and she does have osteoporosis but in the mild range. She is on appropriate treatment with fosamax, calcium, and Vitamin D. She should also perform weight bearing exercise on a regular basis. She should recheck dexascan in 2 years. If she develops Gastroenterology upset from fosamax, she can be switched to Prolia injections every 6 months as long as her insurance covers it. And you can tell her to follow-up with us as well. Her last appointment with us was in 2021. Selena Grayson!   Luis Fernando Gaming     Based on the above no changes but I would have Eli follow-up with Dr. Vaughn in this regards     GI health maintenance: Last colonoscopy 5/9/2024: Diverticulosis/hemorrhoid repeat colonoscopy in 5 years which would be March 2029       PATIENT INSTRUCTIONS:    Patient Instructions   Visit summary:  - Overall labs look great  - You have some osteoporosis I will defer to your family physician you are on preventative medications but perhaps he has further suggestions please follow-up with him in this regards    Continue maintenance for your transplant including vaccinations through your family physician as appropriate, GYN follow-up dermatology follow-up.        1. Medication changes today:  No changes today    2.  General instructions:  General healthy habits including low-salt diet exercise        3.  Please take 1 week a blood pressure readings prior to your next appointment    AS FOLLOWS  MORNING AND EVENING, SITTING AND STANDING as follows:  TAKE THE MORNING READINGS BEFORE ANY MEDICATIONS AND WHEN YOU ARE RELAXED FOR SEVERAL MINUTES  TAKE THE EVENING READINGS:  BETWEEN 7-10 P.M.; PRIOR TO ANY MEDICATIONS; AT LEAST IN OUR  FROM DINNER; AND CERTAINLY AFTER RELAXING FOR A FEW MINUTES  PLEASE INCLUDE  HEART RATE WITH YOUR BLOOD PRESSURE READINGS  When taking standing readings, keep your arm supported at heart level and not dangling  Make sure you are sitting with your back supported and feet on the ground and do not cross your legs or feet  Make sure you have not taken any coffee or caffeine products or exercised or smoke cigarettes at least 30 minutes before taking your blood pressure  Then please mail these readings into the office            4.  Follow-up in 4 months  Please bring in 1 week a blood pressure readings morning evening, sitting and standing is outlined above    Please go for fasting lab work 1-2 weeks prior to your appointment      5.  General non medical recommendations:  AVOID SALT BUT NOT ADDING AN READING LABELS TO MAKE SURE THERE IS LOW-SALT IN THE FOOD THAT YOU ARE EATING  Goal is less than 2 g of sodium intake or less than 5 g of sodium chloride intake per day    Avoid nonsteroidal anti-inflammatory drugs such as Naprosyn, ibuprofen, Aleve, Advil, Celebrex, Meloxicam (Mobic) etc.  You can use Tylenol as needed if you do not have any liver condition to be concerned about    Avoid medications such as Sudafed or decongestants and antihistamines that contained the D component which is the decongestant.  You can take antihistamines without the decongestant or D component.    Try to avoid medications such as pantoprazole or  Protonix/Nexium or Esomeprazole)/Prilosec or omeprazole/Prevacid or lansoprazole/AcipHex or Rabeprazole.  If you are able to, use Pepcid as this is safer for your kidneys.    Try to exercise at least 30 minutes 3 days a week to begin with with an ultimate goal of 5 days a week for at least 30 minutes    Please do not drink more than 2 glasses of alcohol/wine on a daily basis as this may contribute to your high blood pressure.            Subjective:   There has been no hospitalizations or acute illnesses since last visit.  The patient overall is feeling well.  No fevers,  chills, or cough or colds.  Good appetite and good energy  No hematuria, dysuria, voiding symptoms or foamy urine  No gastrointestinal symptoms  No cardiovascular symptoms including swelling of the legs  No headaches, dizziness or lightheadedness  Blood pressure medications:  Losartan 25 mg twice a day  Verapamil 120 mg SR yes twice a day    Renal pertinent medications:  Atorvastatin 10 mg every other day  Fosamax 35 mg weekly/vitamin D with calcium 500 mg daily  Magnesium 500 mg daily  Mycophenolate 500 mg twice a day/prednisone 5 mg daily  Prograf: 0.5 mg twice a day Monday Wednesday and Friday other days 0.5 mg mg in the morning and 1 mg in the evening on Tuesday Thursday Saturday Sunday    ROS:  See HPI, otherwise review of systems as completely reviewed with the patient are negative    Past Medical History:   Diagnosis Date    Chronic kidney disease     Hypertension 5/9/2024    Renal disorder     Urinary tract infection     Vertigo      Past Surgical History:   Procedure Laterality Date    CHOLECYSTECTOMY      PARATHYROID GLAND SURGERY      TRANSPLANTATION RENAL      Kidney X2    URETERAL REIMPLANTATION OF TRANSPLANTED KIDNEY       Family History   Problem Relation Age of Onset    No Known Problems Mother     Atrial fibrillation Father     Cancer Father         Prostate      reports that she has been smoking cigarettes. She has never used smokeless tobacco. She reports that she does not drink alcohol and does not use drugs.    I COMPLETELY REVIEWED THE PAST MEDICAL HISTORY/PAST SURGICAL HISTORY/SOCIAL HISTORY/FAMILY HISTORY/AND MEDICATIONS  AND UPDATED ALL    Objective:     Vitals:   BP sitting on 134/68 with a heart rate of 72 and regular same on right  BP standing on right: 132/78 with a heart rate of 72 and regular    Weight (last 2 days)       None          Wt Readings from Last 3 Encounters:   11/22/24 67.1 kg (148 lb)   07/16/24 65.8 kg (145 lb)   05/09/24 65.1 kg (143 lb 9.6 oz)       Body mass index is  28.9 kg/m².    Physical Exam: General:  No acute distress  Skin:  No acute rash  Eyes:  No scleral icterus, noninjected, no discharge from eyes  ENT:  Moist mucous membranes  Neck:  Supple, no jugular venous distention, trachea is midline, no lymphadenopathy and no thyromegaly  Back   No CVAT  Chest:  Clear to auscultation and percussion, good respiratory effort  CVS:  Regular rate and rhythm without a rub, or gallops or murmurs  Abdomen:  Soft and nontender with normal bowel sounds  Extremities:  No cyanosis and no edema, no arthritic changes, normal range of motion  Neuro:  Grossly intact  Psych:  Alert, oriented x3 and appropriate      Medications:    Current Outpatient Medications:     alendronate (FOSAMAX) 35 mg tablet, TAKE 1 TABLET BY MOUTH BY MOUTH ONCE A WEEK, Disp: 12 tablet, Rfl: 4    ALPRAZolam (XANAX) 0.25 mg tablet, Take 1 tablet (0.25 mg total) by mouth daily as needed for anxiety, Disp: 30 tablet, Rfl: 0    atorvastatin (LIPITOR) 10 mg tablet, TAKE 1 TABLET BY MOUTH EVERY OTHER DAY, Disp: 45 tablet, Rfl: 2    calcium-vitamin D (OSCAL 500 + D) 500 mg-200 units per tablet, Take 1 tablet by mouth daily  , Disp: , Rfl:     Cequa 0.09 % SOLN, instill 1 drop in both eyes twice daily, Disp: , Rfl:     losartan (COZAAR) 25 mg tablet, TAKE 2 TABLETS (50 MG TOTAL) BY MOUTH DAILY AFTER DINNER (Patient taking differently: Take 25 mg by mouth 2 (two) times a day), Disp: 180 tablet, Rfl: 1    MAGNESIUM PO, Take 500 mg by mouth daily, Disp: , Rfl:     mycophenolate (CELLCEPT) 500 mg tablet, Take 1 tablet (500 mg total) by mouth 2 (two) times a day, Disp: 180 tablet, Rfl: 3    nitrofurantoin (MACRODANTIN) 50 mg capsule, Take 1 capsule (50 mg total) by mouth daily, Disp: 90 capsule, Rfl: 3    predniSONE 5 mg tablet, Take 1 tablet (5 mg total) by mouth daily, Disp: 90 tablet, Rfl: 3    Sodium Fluoride 5000 Sensitive 1.1-5 % PSTE, USE TWICE DAILY IN PLACE OF TOOTHPASTE, Disp: , Rfl:     tacrolimus (PROGRAF) 0.5 mg  capsule, Take 1 capsule twice a day on Monday Wednesday and Friday On Tuesday Thursday and Saturday and Sunday take 1 capsule in the am and 2 capsules in the pm, Disp: 230 capsule, Rfl: 3    verapamil (CALAN-SR) 240 mg CR tablet, TAKE 1 TABLET (240 MG TOTAL) BY MOUTH DAILY IN THE EARLY MORNING, Disp: 90 tablet, Rfl: 1    Calcium Carbonate 1500 (600 Ca) MG TABS, Take 600 mg by mouth daily (Patient not taking: Reported on 12/6/2023), Disp: , Rfl:     Lab, Imaging and other studies: I have personally reviewed pertinent labs.  Laboratory Results:  Results for orders placed or performed in visit on 11/23/24   Magnesium    Collection Time: 11/23/24  9:17 AM   Result Value Ref Range    Magnesium, Serum 1.7 1.5 - 2.5 mg/dL   Comprehensive metabolic panel    Collection Time: 11/23/24  9:17 AM   Result Value Ref Range    Glucose, Random 69 65 - 99 mg/dL    BUN 15 7 - 25 mg/dL    Creatinine 0.73 0.50 - 1.03 mg/dL    eGFR 96 > OR = 60 mL/min/1.73m2    SL AMB BUN/CREATININE RATIO SEE NOTE: 6 - 22 (calc)    Sodium 143 135 - 146 mmol/L    Potassium 4.3 3.5 - 5.3 mmol/L    Chloride 106 98 - 110 mmol/L    CO2 31 20 - 32 mmol/L    Calcium 9.6 8.6 - 10.4 mg/dL    Protein, Total 6.6 6.1 - 8.1 g/dL    Albumin 3.9 3.6 - 5.1 g/dL    Globulin 2.7 1.9 - 3.7 g/dL (calc)    Albumin/Globulin Ratio 1.4 1.0 - 2.5 (calc)    TOTAL BILIRUBIN 0.6 0.2 - 1.2 mg/dL    Alkaline Phosphatase 55 37 - 153 U/L    AST 14 10 - 35 U/L    ALT 11 6 - 29 U/L   CBC    Collection Time: 11/23/24  9:17 AM   Result Value Ref Range    White Blood Cell Count 7.2 3.8 - 10.8 Thousand/uL    Red Blood Cell Count 4.72 3.80 - 5.10 Million/uL    Hemoglobin 14.9 11.7 - 15.5 g/dL    HCT 45.7 (H) 35.0 - 45.0 %    MCV 96.8 80.0 - 100.0 fL    MCH 31.6 27.0 - 33.0 pg    MCHC 32.6 32.0 - 36.0 g/dL    RDW 12.5 11.0 - 15.0 %    Platelet Count 230 140 - 400 Thousand/uL    SL AMB MPV 10.3 7.5 - 12.5 fL   Protein, Total w/Creat, Random Urine    Collection Time: 11/23/24  9:17 AM   Result  "Value Ref Range    Creatinine, Urine 86 20 - 275 mg/dL    Protein/Creat Ratio 93 24 - 184 mg/g creat    Protein/Creat Ratio 0.093 0.024 - 0.184 mg/mg creat    Total Protein, Urine 8 5 - 24 mg/dL   Tacrolimus level    Collection Time: 11/23/24  9:17 AM   Result Value Ref Range    Tacrolimus, Highly Sensitive, LC/MS/MS 5.8 mcg/L             Invalid input(s): \"ALBUMIN\"      Radiology review:   chest X-ray    Ultrasound      Portions of the record may have been created with voice recognition software.  Occasional wrong word or \"sound a like\" substitutions may have occurred due to the inherent limitations of voice recognition software.  Read the chart carefully and recognize, using context, where substitutions have occurred.                    "

## 2024-12-17 ENCOUNTER — TELEPHONE (OUTPATIENT)
Dept: NEPHROLOGY | Facility: CLINIC | Age: 59
End: 2024-12-17

## 2024-12-17 ENCOUNTER — OFFICE VISIT (OUTPATIENT)
Dept: NEPHROLOGY | Facility: CLINIC | Age: 59
End: 2024-12-17
Payer: COMMERCIAL

## 2024-12-17 VITALS — HEIGHT: 60 IN | BODY MASS INDEX: 28.9 KG/M2

## 2024-12-17 DIAGNOSIS — N18.2 CKD (CHRONIC KIDNEY DISEASE) STAGE 2, GFR 60-89 ML/MIN: ICD-10-CM

## 2024-12-17 DIAGNOSIS — D84.9 IMMUNOSUPPRESSION (HCC): ICD-10-CM

## 2024-12-17 DIAGNOSIS — I12.9 HYPERTENSIVE CHRONIC KIDNEY DISEASE WITH STAGE 1 THROUGH STAGE 4 CHRONIC KIDNEY DISEASE, OR UNSPECIFIED CHRONIC KIDNEY DISEASE: Primary | ICD-10-CM

## 2024-12-17 DIAGNOSIS — E83.9 CHRONIC KIDNEY DISEASE-MINERAL AND BONE DISORDER: ICD-10-CM

## 2024-12-17 DIAGNOSIS — Z92.25 PERSONAL HISTORY OF IMMUNOSUPRESSION THERAPY: ICD-10-CM

## 2024-12-17 DIAGNOSIS — M89.9 CHRONIC KIDNEY DISEASE-MINERAL AND BONE DISORDER: ICD-10-CM

## 2024-12-17 DIAGNOSIS — Z94.0 KIDNEY TRANSPLANTED: ICD-10-CM

## 2024-12-17 DIAGNOSIS — E78.5 DYSLIPIDEMIA: ICD-10-CM

## 2024-12-17 DIAGNOSIS — N18.9 CHRONIC KIDNEY DISEASE-MINERAL AND BONE DISORDER: ICD-10-CM

## 2024-12-17 PROCEDURE — 99214 OFFICE O/P EST MOD 30 MIN: CPT | Performed by: INTERNAL MEDICINE

## 2024-12-17 NOTE — PATIENT INSTRUCTIONS
Visit summary:  - Overall labs look great  - You have some osteoporosis I will defer to your family physician you are on preventative medications but perhaps he has further suggestions please follow-up with him in this regards    Continue maintenance for your transplant including vaccinations through your family physician as appropriate, GYN follow-up dermatology follow-up.        1. Medication changes today:  No changes today    2.  General instructions:  General healthy habits including low-salt diet exercise        3.  Please take 1 week a blood pressure readings prior to your next appointment    AS FOLLOWS  MORNING AND EVENING, SITTING AND STANDING as follows:  TAKE THE MORNING READINGS BEFORE ANY MEDICATIONS AND WHEN YOU ARE RELAXED FOR SEVERAL MINUTES  TAKE THE EVENING READINGS:  BETWEEN 7-10 P.M.; PRIOR TO ANY MEDICATIONS; AT LEAST IN OUR  FROM DINNER; AND CERTAINLY AFTER RELAXING FOR A FEW MINUTES  PLEASE INCLUDE HEART RATE WITH YOUR BLOOD PRESSURE READINGS  When taking standing readings, keep your arm supported at heart level and not dangling  Make sure you are sitting with your back supported and feet on the ground and do not cross your legs or feet  Make sure you have not taken any coffee or caffeine products or exercised or smoke cigarettes at least 30 minutes before taking your blood pressure  Then please mail these readings into the office            4.  Follow-up in 4 months  Please bring in 1 week a blood pressure readings morning evening, sitting and standing is outlined above    Please go for fasting lab work 1-2 weeks prior to your appointment      5.  General non medical recommendations:  AVOID SALT BUT NOT ADDING AN READING LABELS TO MAKE SURE THERE IS LOW-SALT IN THE FOOD THAT YOU ARE EATING  Goal is less than 2 g of sodium intake or less than 5 g of sodium chloride intake per day    Avoid nonsteroidal anti-inflammatory drugs such as Naprosyn, ibuprofen, Aleve, Advil, Celebrex,  Meloxicam (Mobic) etc.  You can use Tylenol as needed if you do not have any liver condition to be concerned about    Avoid medications such as Sudafed or decongestants and antihistamines that contained the D component which is the decongestant.  You can take antihistamines without the decongestant or D component.    Try to avoid medications such as pantoprazole or  Protonix/Nexium or Esomeprazole)/Prilosec or omeprazole/Prevacid or lansoprazole/AcipHex or Rabeprazole.  If you are able to, use Pepcid as this is safer for your kidneys.    Try to exercise at least 30 minutes 3 days a week to begin with with an ultimate goal of 5 days a week for at least 30 minutes    Please do not drink more than 2 glasses of alcohol/wine on a daily basis as this may contribute to your high blood pressure.

## 2024-12-17 NOTE — LETTER
December 17, 2024     Luis Fernando Gaming MD  [address]  [address]  [facility] [address]    Patient: Eli Pittman   YOB: 1965   Date of Visit: 12/17/2024       Dear Dr. Gaming:    Thank you for referring Eli Pittman to me for evaluation. Below are my notes for this consultation.    If you have questions, please do not hesitate to call me. I look forward to following your patient along with you.         Sincerely,        Cody Castillo MD        CC: No Recipients    Cody Castillo MD  12/17/2024  8:02 AM  Sign when Signing Visit  RENAL FOLLOW UP NOTE:.td    ASSESSMENT AND PLAN:  1.  CKD stage 2.:  Etiology:  Status post LRT 1997.  Baseline creatinine:  Less than 1  Current creatinine:  0.73 mg/dL at baseline as of 11/23/2024  Urine protein creatinine ratio:  Insignificant   Tacrolimus trough level : 5.8 at goal from 11/23/2024  Recommendations:  Treat hypertension-please see below  Treat dyslipidemia-please see below  Continue immunosuppressive medications as currently prescribed  Maintain proteinuria less than 1 g or as low as possible  Avoid nephrotoxic agents such as NSAIDs, patient counseled as such  2.  Volume:  Euvolemic  3.  Hypertension:   Home blood pressure readings:    A.m.: 123/73, standing 110/72  P.m. 130/74, standing 119/72  Heart rate: 70 range     Goal blood pressure:  Less than 130/80  Recommendations:  Push nonmedical regimen including weight loss, isotonic exercise and avoidance of salt; patient counseled as such  Medication changes today:  No changes patient is at goal     4.  Electrolytes:  All acceptable     5.  Mineral bone disorder:  Secondary to CKD  Calcium/magnesium/phosphorus: Magnesium borderline low at 1.7 continue on oral supplement  PTH intact level: 30 which is acceptable  Vitamin-D:  44 at goal      6.  Dyslipidemia:  Goal LDL:  Less than 100  Current lipid profile:  LDL 56/HDL 73/triglycerides 85 from 6/29/2024  Recommendations:  No changes at this time as patient  is at goal     7.  Anemia:  Normal at 14.9 with normal platelet count     8.  Other problems:  History of urinary tract infections monitored:  Follow-up urinalysis back in April was unremarkable: UA from 4/15/2023 no evidence of urinary tract infection; ultrasound was normal transplant kidney mild increased resistive index from April 24, 2020  Patient with dizziness abnormal ECG from 02/07/2020 which was personally reviewed by Dr. Pate who felt there is no significant change.  Dr. Pate felt she had vertigo which essentially resolved.  No further cardiac testing was recommended at that time.  Osteoporosis on Fosamax vitamin D and calcium I would defer to primary physician for anything further at this juncture     GI health maintenance: Last colonoscopy 5/9/2024: Diverticulosis/hemorrhoid repeat colonoscopy in 5 years which would be March 2029       PATIENT INSTRUCTIONS:    Patient Instructions   Visit summary:  - Overall labs look great  - You have some osteoporosis I will defer to your family physician you are on preventative medications but perhaps he has further suggestions please follow-up with him in this regards    Continue maintenance for your transplant including vaccinations through your family physician as appropriate, GYN follow-up dermatology follow-up.        1. Medication changes today:  No changes today    2.  General instructions:  General healthy habits including low-salt diet exercise        3.  Please take 1 week a blood pressure readings prior to your next appointment    AS FOLLOWS  MORNING AND EVENING, SITTING AND STANDING as follows:  TAKE THE MORNING READINGS BEFORE ANY MEDICATIONS AND WHEN YOU ARE RELAXED FOR SEVERAL MINUTES  TAKE THE EVENING READINGS:  BETWEEN 7-10 P.M.; PRIOR TO ANY MEDICATIONS; AT LEAST IN OUR  FROM DINNER; AND CERTAINLY AFTER RELAXING FOR A FEW MINUTES  PLEASE INCLUDE HEART RATE WITH YOUR BLOOD PRESSURE READINGS  When taking standing readings, keep your arm  supported at heart level and not dangling  Make sure you are sitting with your back supported and feet on the ground and do not cross your legs or feet  Make sure you have not taken any coffee or caffeine products or exercised or smoke cigarettes at least 30 minutes before taking your blood pressure  Then please mail these readings into the office            4.  Follow-up in 4 months  Please bring in 1 week a blood pressure readings morning evening, sitting and standing is outlined above    Please go for fasting lab work 1-2 weeks prior to your appointment      5.  General non medical recommendations:  AVOID SALT BUT NOT ADDING AN READING LABELS TO MAKE SURE THERE IS LOW-SALT IN THE FOOD THAT YOU ARE EATING  Goal is less than 2 g of sodium intake or less than 5 g of sodium chloride intake per day    Avoid nonsteroidal anti-inflammatory drugs such as Naprosyn, ibuprofen, Aleve, Advil, Celebrex, Meloxicam (Mobic) etc.  You can use Tylenol as needed if you do not have any liver condition to be concerned about    Avoid medications such as Sudafed or decongestants and antihistamines that contained the D component which is the decongestant.  You can take antihistamines without the decongestant or D component.    Try to avoid medications such as pantoprazole or  Protonix/Nexium or Esomeprazole)/Prilosec or omeprazole/Prevacid or lansoprazole/AcipHex or Rabeprazole.  If you are able to, use Pepcid as this is safer for your kidneys.    Try to exercise at least 30 minutes 3 days a week to begin with with an ultimate goal of 5 days a week for at least 30 minutes    Please do not drink more than 2 glasses of alcohol/wine on a daily basis as this may contribute to your high blood pressure.            Subjective:   There has been no hospitalizations or acute illnesses since last visit.  The patient overall is feeling well.  No fevers, chills, or cough or colds.  Good appetite and good energy  No hematuria, dysuria, voiding  symptoms or foamy urine  No gastrointestinal symptoms  No cardiovascular symptoms including swelling of the legs  No headaches, dizziness or lightheadedness  Blood pressure medications:  Losartan 25 mg twice a day  Verapamil 120 mg SR yes twice a day    Renal pertinent medications:  Atorvastatin 10 mg every other day  Fosamax 35 mg weekly/vitamin D with calcium 500 mg daily  Magnesium 500 mg daily  Mycophenolate 500 mg twice a day/prednisone 5 mg daily  Prograf: 0.5 mg twice a day Monday Wednesday and Friday other days 0.5 mg mg in the morning and 1 mg in the evening on Tuesday Thursday Saturday Sunday    ROS:  See HPI, otherwise review of systems as completely reviewed with the patient are negative    Past Medical History:   Diagnosis Date   • Chronic kidney disease    • Hypertension 5/9/2024   • Renal disorder    • Urinary tract infection    • Vertigo      Past Surgical History:   Procedure Laterality Date   • CHOLECYSTECTOMY     • PARATHYROID GLAND SURGERY     • TRANSPLANTATION RENAL      Kidney X2   • URETERAL REIMPLANTATION OF TRANSPLANTED KIDNEY       Family History   Problem Relation Age of Onset   • No Known Problems Mother    • Atrial fibrillation Father    • Cancer Father         Prostate      reports that she has been smoking cigarettes. She has never used smokeless tobacco. She reports that she does not drink alcohol and does not use drugs.    I COMPLETELY REVIEWED THE PAST MEDICAL HISTORY/PAST SURGICAL HISTORY/SOCIAL HISTORY/FAMILY HISTORY/AND MEDICATIONS  AND UPDATED ALL    Objective:     Vitals:   BP sitting on 134/68 with a heart rate of 72 and regular same on right  BP standing on right: 132/78 with a heart rate of 72 and regular    Weight (last 2 days)       None          Wt Readings from Last 3 Encounters:   11/22/24 67.1 kg (148 lb)   07/16/24 65.8 kg (145 lb)   05/09/24 65.1 kg (143 lb 9.6 oz)       Body mass index is 28.9 kg/m².    Physical Exam: General:  No acute distress  Skin:  No acute  rash  Eyes:  No scleral icterus, noninjected, no discharge from eyes  ENT:  Moist mucous membranes  Neck:  Supple, no jugular venous distention, trachea is midline, no lymphadenopathy and no thyromegaly  Back   No CVAT  Chest:  Clear to auscultation and percussion, good respiratory effort  CVS:  Regular rate and rhythm without a rub, or gallops or murmurs  Abdomen:  Soft and nontender with normal bowel sounds  Extremities:  No cyanosis and no edema, no arthritic changes, normal range of motion  Neuro:  Grossly intact  Psych:  Alert, oriented x3 and appropriate      Medications:    Current Outpatient Medications:   •  alendronate (FOSAMAX) 35 mg tablet, TAKE 1 TABLET BY MOUTH BY MOUTH ONCE A WEEK, Disp: 12 tablet, Rfl: 4  •  ALPRAZolam (XANAX) 0.25 mg tablet, Take 1 tablet (0.25 mg total) by mouth daily as needed for anxiety, Disp: 30 tablet, Rfl: 0  •  atorvastatin (LIPITOR) 10 mg tablet, TAKE 1 TABLET BY MOUTH EVERY OTHER DAY, Disp: 45 tablet, Rfl: 2  •  calcium-vitamin D (OSCAL 500 + D) 500 mg-200 units per tablet, Take 1 tablet by mouth daily  , Disp: , Rfl:   •  Cequa 0.09 % SOLN, instill 1 drop in both eyes twice daily, Disp: , Rfl:   •  losartan (COZAAR) 25 mg tablet, TAKE 2 TABLETS (50 MG TOTAL) BY MOUTH DAILY AFTER DINNER (Patient taking differently: Take 25 mg by mouth 2 (two) times a day), Disp: 180 tablet, Rfl: 1  •  MAGNESIUM PO, Take 500 mg by mouth daily, Disp: , Rfl:   •  mycophenolate (CELLCEPT) 500 mg tablet, Take 1 tablet (500 mg total) by mouth 2 (two) times a day, Disp: 180 tablet, Rfl: 3  •  nitrofurantoin (MACRODANTIN) 50 mg capsule, Take 1 capsule (50 mg total) by mouth daily, Disp: 90 capsule, Rfl: 3  •  predniSONE 5 mg tablet, Take 1 tablet (5 mg total) by mouth daily, Disp: 90 tablet, Rfl: 3  •  Sodium Fluoride 5000 Sensitive 1.1-5 % PSTE, USE TWICE DAILY IN PLACE OF TOOTHPASTE, Disp: , Rfl:   •  tacrolimus (PROGRAF) 0.5 mg capsule, Take 1 capsule twice a day on Monday Wednesday and Friday  On Tuesday Thursday and Saturday and Sunday take 1 capsule in the am and 2 capsules in the pm, Disp: 230 capsule, Rfl: 3  •  verapamil (CALAN-SR) 240 mg CR tablet, TAKE 1 TABLET (240 MG TOTAL) BY MOUTH DAILY IN THE EARLY MORNING, Disp: 90 tablet, Rfl: 1  •  Calcium Carbonate 1500 (600 Ca) MG TABS, Take 600 mg by mouth daily (Patient not taking: Reported on 12/6/2023), Disp: , Rfl:     Lab, Imaging and other studies: I have personally reviewed pertinent labs.  Laboratory Results:  Results for orders placed or performed in visit on 11/23/24   Magnesium    Collection Time: 11/23/24  9:17 AM   Result Value Ref Range    Magnesium, Serum 1.7 1.5 - 2.5 mg/dL   Comprehensive metabolic panel    Collection Time: 11/23/24  9:17 AM   Result Value Ref Range    Glucose, Random 69 65 - 99 mg/dL    BUN 15 7 - 25 mg/dL    Creatinine 0.73 0.50 - 1.03 mg/dL    eGFR 96 > OR = 60 mL/min/1.73m2    SL AMB BUN/CREATININE RATIO SEE NOTE: 6 - 22 (calc)    Sodium 143 135 - 146 mmol/L    Potassium 4.3 3.5 - 5.3 mmol/L    Chloride 106 98 - 110 mmol/L    CO2 31 20 - 32 mmol/L    Calcium 9.6 8.6 - 10.4 mg/dL    Protein, Total 6.6 6.1 - 8.1 g/dL    Albumin 3.9 3.6 - 5.1 g/dL    Globulin 2.7 1.9 - 3.7 g/dL (calc)    Albumin/Globulin Ratio 1.4 1.0 - 2.5 (calc)    TOTAL BILIRUBIN 0.6 0.2 - 1.2 mg/dL    Alkaline Phosphatase 55 37 - 153 U/L    AST 14 10 - 35 U/L    ALT 11 6 - 29 U/L   CBC    Collection Time: 11/23/24  9:17 AM   Result Value Ref Range    White Blood Cell Count 7.2 3.8 - 10.8 Thousand/uL    Red Blood Cell Count 4.72 3.80 - 5.10 Million/uL    Hemoglobin 14.9 11.7 - 15.5 g/dL    HCT 45.7 (H) 35.0 - 45.0 %    MCV 96.8 80.0 - 100.0 fL    MCH 31.6 27.0 - 33.0 pg    MCHC 32.6 32.0 - 36.0 g/dL    RDW 12.5 11.0 - 15.0 %    Platelet Count 230 140 - 400 Thousand/uL    SL AMB MPV 10.3 7.5 - 12.5 fL   Protein, Total w/Creat, Random Urine    Collection Time: 11/23/24  9:17 AM   Result Value Ref Range    Creatinine, Urine 86 20 - 275 mg/dL     "Protein/Creat Ratio 93 24 - 184 mg/g creat    Protein/Creat Ratio 0.093 0.024 - 0.184 mg/mg creat    Total Protein, Urine 8 5 - 24 mg/dL   Tacrolimus level    Collection Time: 11/23/24  9:17 AM   Result Value Ref Range    Tacrolimus, Highly Sensitive, LC/MS/MS 5.8 mcg/L             Invalid input(s): \"ALBUMIN\"      Radiology review:   chest X-ray    Ultrasound      Portions of the record may have been created with voice recognition software.  Occasional wrong word or \"sound a like\" substitutions may have occurred due to the inherent limitations of voice recognition software.  Read the chart carefully and recognize, using context, where substitutions have occurred.                    "

## 2024-12-17 NOTE — TELEPHONE ENCOUNTER
----- Message from Cody Castillo MD sent at 12/17/2024  9:02 AM EST -----  Please review with Eli that Dr. Gaming felt that osteoporosis is in mid range acceptable at this time and agrees with the Fosamax calcium and vitamin D but also recommended weightbearing exercises.  He recommended follow-up with his office and should get a DEXA scan in 2 years which she can handle  Thank so much  ----- Message -----  From: Luis Fernando Gaming MD  Sent: 12/17/2024   8:58 AM EST  To: MD Albert Mccann, All is good here. Hope things are well with you too! I looked at her dexascan and she does have osteoporosis but in the mild range. She is on appropriate treatment with fosamax, calcium, and Vitamin D. She should also perform weight bearing exercise on a regular basis. She should recheck dexascan in 2 years. If she develops Gastroenterology upset from fosamax, she can be switched to Prolia injections every 6 months as long as her insurance covers it. And you can tell her to follow-up with us as well. Her last appointment with us was in 2021. Selena Grayson!   Luis Fernando Gaming  ----- Message -----  From: Cody Castillo MD  Sent: 12/17/2024   7:59 AM EST  To: MD Albert Damico!  Hope all is well  Eli had a DEXA scan: There is definitely osteoporosis she is on Fosamax as well as vitamin D calcium I did not know if there is anything further to do this is out of my expertise I was wondering if you could weigh in on this    Thanks so much  Regards  Quang

## 2024-12-27 DIAGNOSIS — E78.5 DYSLIPIDEMIA: ICD-10-CM

## 2024-12-27 DIAGNOSIS — I12.9 PARENCHYMAL RENAL HYPERTENSION, STAGE 1 THROUGH STAGE 4 OR UNSPECIFIED CHRONIC KIDNEY DISEASE: ICD-10-CM

## 2024-12-27 RX ORDER — VERAPAMIL HYDROCHLORIDE 240 MG/1
240 TABLET, FILM COATED, EXTENDED RELEASE ORAL
Qty: 90 TABLET | Refills: 1 | Status: SHIPPED | OUTPATIENT
Start: 2024-12-27

## 2025-03-10 ENCOUNTER — TELEPHONE (OUTPATIENT)
Dept: NEPHROLOGY | Facility: CLINIC | Age: 60
End: 2025-03-10

## 2025-03-12 ENCOUNTER — TELEPHONE (OUTPATIENT)
Age: 60
End: 2025-03-12

## 2025-03-12 NOTE — TELEPHONE ENCOUNTER
Patient called regarding her symptoms, she tested positive for Covid last night.  Eli has been sick since Thursday and had a low grade fever til Sunday. She has no fever now but her head is stuffy and she can't taste.  I tried to schedule her for a same day but was not able to as she mentioned she is working.  She would like PCP to know and to see what he recommends.    Please advise and contact patient  Thank you

## 2025-03-12 NOTE — TELEPHONE ENCOUNTER
At this point, she is beyond the window for paxlovid. She should take Vitamin D, Vitamin C, and Zinc. She should also treat her symptoms as needed and get lots of rest and fluids.

## 2025-03-21 ENCOUNTER — OFFICE VISIT (OUTPATIENT)
Age: 60
End: 2025-03-21
Payer: COMMERCIAL

## 2025-03-21 ENCOUNTER — TELEPHONE (OUTPATIENT)
Dept: FAMILY MEDICINE CLINIC | Facility: CLINIC | Age: 60
End: 2025-03-21

## 2025-03-21 VITALS
SYSTOLIC BLOOD PRESSURE: 110 MMHG | HEART RATE: 62 BPM | TEMPERATURE: 98 F | HEIGHT: 60 IN | BODY MASS INDEX: 29.25 KG/M2 | RESPIRATION RATE: 18 BRPM | OXYGEN SATURATION: 99 % | WEIGHT: 149 LBS | DIASTOLIC BLOOD PRESSURE: 70 MMHG

## 2025-03-21 DIAGNOSIS — U07.1 COVID: Primary | ICD-10-CM

## 2025-03-21 PROCEDURE — 99213 OFFICE O/P EST LOW 20 MIN: CPT | Performed by: INTERNAL MEDICINE

## 2025-03-21 NOTE — TELEPHONE ENCOUNTER
LM on VM and sent myc msg - need to rs todays apt due to provider being out sick.    Pt can be scheduled with another provider here if available or with Dr Cardona @ [facility]

## 2025-03-21 NOTE — PROGRESS NOTES
Name: Eli Pittman      : 1965      MRN: [id number]  Encounter Provider: Tasneem Cardona MD  Encounter Date: 3/21/2025   Encounter department: [facility]  :  Assessment & Plan  COVID  Patient had a COVID symptoms 2 weeks back, tested positive on 3/12.  Has a residual cough and some congestion.  Reassured that cough may persist for a few weeks after COVID infection, encouraged hydration, adequate rest, continue OTC cough medications, follow-up with primary PCP for persistent or worsening symptoms in 1 to 2 weeks              History of Present Illness   History of COVID, confirmed with a home testing on 3/12.  Patient comes planes of persistent cough with mild mucus and congestion.  No fever chills nausea vomiting diarrhea chest pain or shortness of breath.      Review of Systems   Constitutional:  Negative for activity change, appetite change, chills, diaphoresis, fatigue, fever and unexpected weight change.   HENT:  Negative for congestion, drooling, ear discharge, ear pain, facial swelling, hearing loss, postnasal drip, rhinorrhea, sinus pressure, sinus pain, sneezing, sore throat, tinnitus, trouble swallowing and voice change.    Eyes:  Negative for discharge.   Respiratory:  Positive for cough and wheezing. Negative for apnea, choking, chest tightness, shortness of breath and stridor.    Cardiovascular:  Negative for chest pain, palpitations and leg swelling.   Gastrointestinal:  Negative for abdominal distention, abdominal pain, anal bleeding, blood in stool, constipation, diarrhea, nausea and vomiting.   Genitourinary:  Negative for decreased urine volume, difficulty urinating, frequency and urgency.   Musculoskeletal:  Negative for arthralgias, back pain and myalgias.   Skin:  Negative for color change.   Neurological:  Negative for dizziness, light-headedness, numbness and headaches.         Past Medical History   Past Medical History:   Diagnosis Date    Anxiety      Chronic kidney disease     Hypertension 05/09/2024    Pneumonia     Renal disorder     Urinary tract infection     Vertigo      Past Surgical History:   Procedure Laterality Date    CHOLECYSTECTOMY      PARATHYROID GLAND SURGERY      TRANSPLANTATION RENAL      Kidney X2    URETERAL REIMPLANTATION OF TRANSPLANTED KIDNEY       Family History   Problem Relation Age of Onset    No Known Problems Mother     Atrial fibrillation Father     Cancer Father         Prostate    Hearing loss Father       reports that she has been smoking cigarettes. She has never used smokeless tobacco. She reports that she does not drink alcohol and does not use drugs.  Current Outpatient Medications   Medication Instructions    alendronate (FOSAMAX) 35 mg tablet TAKE 1 TABLET BY MOUTH BY MOUTH ONCE A WEEK    ALPRAZolam (XANAX) 0.25 mg, Oral, Daily PRN    atorvastatin (LIPITOR) 10 mg tablet TAKE 1 TABLET BY MOUTH EVERY OTHER DAY    Calcium Carbonate 600 mg, Daily    calcium-vitamin D (OSCAL 500 + D) 500 mg-200 units per tablet 1 tablet, Daily    Cequa 0.09 % SOLN instill 1 drop in both eyes twice daily    losartan (COZAAR) 25 mg tablet TAKE 2 TABLETS (50 MG TOTAL) BY MOUTH DAILY AFTER DINNER    MAGNESIUM  mg, Daily    mycophenolate (CELLCEPT) 500 mg, Oral, 2 times daily    nitrofurantoin (MACRODANTIN) 50 mg, Oral, Daily    predniSONE 5 mg, Oral, Daily    Sodium Fluoride 5000 Sensitive 1.1-5 % PSTE USE TWICE DAILY IN PLACE OF TOOTHPASTE    tacrolimus (PROGRAF) 0.5 mg capsule Take 1 capsule twice a day on Monday Wednesday and Friday On Tuesday Thursday and Saturday and Sunday take 1 capsule in the am and 2 capsules in the pm    verapamil (CALAN-SR) 240 mg, Oral, Daily (early morning)   No Known Allergies   Objective   There were no vitals taken for this visit.     Physical Exam  Constitutional:       General: She is not in acute distress.     Appearance: Normal appearance. She is normal weight. She is not ill-appearing, toxic-appearing or  diaphoretic.   HENT:      Right Ear: Tympanic membrane normal.      Left Ear: Tympanic membrane normal.      Mouth/Throat:      Pharynx: No oropharyngeal exudate or posterior oropharyngeal erythema.   Cardiovascular:      Rate and Rhythm: Normal rate and regular rhythm.      Pulses: Normal pulses.      Heart sounds: Normal heart sounds. No murmur heard.     No gallop.   Pulmonary:      Effort: Pulmonary effort is normal. No respiratory distress.      Breath sounds: Normal breath sounds. No stridor. No wheezing, rhonchi or rales.   Chest:      Chest wall: No tenderness.   Musculoskeletal:      Right lower leg: No edema.      Left lower leg: No edema.   Neurological:      Mental Status: She is alert and oriented to person, place, and time.

## 2025-03-26 ENCOUNTER — TELEPHONE (OUTPATIENT)
Dept: NEPHROLOGY | Facility: CLINIC | Age: 60
End: 2025-03-26

## 2025-04-25 ENCOUNTER — RESULTS FOLLOW-UP (OUTPATIENT)
Dept: NEPHROLOGY | Facility: CLINIC | Age: 60
End: 2025-04-25

## 2025-04-25 LAB
25(OH)D3 SERPL-MCNC: 50 NG/ML (ref 30–100)
ALBUMIN SERPL-MCNC: 3.9 G/DL (ref 3.6–5.1)
ALBUMIN/GLOB SERPL: 1.6 (CALC) (ref 1–2.5)
ALP SERPL-CCNC: 50 U/L (ref 37–153)
ALT SERPL-CCNC: 9 U/L (ref 6–29)
AST SERPL-CCNC: 11 U/L (ref 10–35)
BASOPHILS # BLD AUTO: 54 CELLS/UL (ref 0–200)
BASOPHILS NFR BLD AUTO: 0.7 %
BILIRUB SERPL-MCNC: 0.7 MG/DL (ref 0.2–1.2)
BUN SERPL-MCNC: 16 MG/DL (ref 7–25)
BUN/CREAT SERPL: NORMAL (CALC) (ref 6–22)
CALCIUM SERPL-MCNC: 9.1 MG/DL (ref 8.6–10.4)
CALCIUM SERPL-MCNC: 9.1 MG/DL (ref 8.6–10.4)
CHLORIDE SERPL-SCNC: 107 MMOL/L (ref 98–110)
CO2 SERPL-SCNC: 26 MMOL/L (ref 20–32)
CREAT SERPL-MCNC: 0.73 MG/DL (ref 0.5–1.03)
CREAT UR-MCNC: 82 MG/DL (ref 20–275)
EOSINOPHIL # BLD AUTO: 100 CELLS/UL (ref 15–500)
EOSINOPHIL NFR BLD AUTO: 1.3 %
ERYTHROCYTE [DISTWIDTH] IN BLOOD BY AUTOMATED COUNT: 13 % (ref 11–15)
GFR/BSA.PRED SERPLBLD CYS-BASED-ARV: 95 ML/MIN/1.73M2
GLOBULIN SER CALC-MCNC: 2.5 G/DL (CALC) (ref 1.9–3.7)
GLUCOSE SERPL-MCNC: 74 MG/DL (ref 65–99)
HCT VFR BLD AUTO: 43.3 % (ref 35–45)
HGB BLD-MCNC: 14.2 G/DL (ref 11.7–15.5)
LYMPHOCYTES # BLD AUTO: 3180 CELLS/UL (ref 850–3900)
LYMPHOCYTES NFR BLD AUTO: 41.3 %
MAGNESIUM SERPL-MCNC: 1.7 MG/DL (ref 1.5–2.5)
MCH RBC QN AUTO: 31.6 PG (ref 27–33)
MCHC RBC AUTO-ENTMCNC: 32.8 G/DL (ref 32–36)
MCV RBC AUTO: 96.2 FL (ref 80–100)
MONOCYTES # BLD AUTO: 778 CELLS/UL (ref 200–950)
MONOCYTES NFR BLD AUTO: 10.1 %
NEUTROPHILS # BLD AUTO: 3588 CELLS/UL (ref 1500–7800)
NEUTROPHILS NFR BLD AUTO: 46.6 %
PLATELET # BLD AUTO: 211 THOUSAND/UL (ref 140–400)
PMV BLD REES-ECKER: 11 FL (ref 7.5–12.5)
POTASSIUM SERPL-SCNC: 4.1 MMOL/L (ref 3.5–5.3)
PROT SERPL-MCNC: 6.4 G/DL (ref 6.1–8.1)
PROT UR-MCNC: 7 MG/DL (ref 5–24)
PROT/CREAT UR: 0.09 MG/MG CREAT (ref 0.02–0.18)
PROT/CREAT UR: 85 MG/G CREAT (ref 24–184)
PTH-INTACT SERPL-MCNC: 52 PG/ML (ref 16–77)
RBC # BLD AUTO: 4.5 MILLION/UL (ref 3.8–5.1)
SODIUM SERPL-SCNC: 142 MMOL/L (ref 135–146)
WBC # BLD AUTO: 7.7 THOUSAND/UL (ref 3.8–10.8)

## 2025-04-25 NOTE — RESULT ENCOUNTER NOTE
Covering Dr. Castillo in basket.  Labs reviewed and stable.  Magnesium slightly below goal.  Please call patient and let her know her labs are stable although magnesium is slightly low.  Please confirm that patient is taking magnesium supplement.

## 2025-04-28 NOTE — TELEPHONE ENCOUNTER
Left a massage about the lab results are stable only that her magnesium is low .Also told the patient to call use to confirm that she is still taking magnesium supplement.            ----- Message from Carrie Escalante PA-C sent at 4/25/2025  4:04 PM EDT -----  Covering Dr. Castillo in basket.  Labs reviewed and stable.  Magnesium slightly below goal.  Please call patient and let her know her labs are stable although magnesium is slightly low.  Please confirm that patient is taking magnesium supplement.

## 2025-05-05 DIAGNOSIS — N18.1 CKD (CHRONIC KIDNEY DISEASE), SYMPTOM MANAGEMENT ONLY, STAGE 1: ICD-10-CM

## 2025-05-05 NOTE — TELEPHONE ENCOUNTER
Reason for call:   [x] Refill   [] Prior Auth  [] Other:     Office:   [x] Specialty/Provider - Castillo    Medication: alendronate    Dose/Frequency: 35mg once a week    Quantity: 12    Pharmacy: [facility]/pharmacy #[id number] - [place] - [address] [place].     Local Pharmacy   Does the patient have enough for 3 days?   [x] Yes   [] No - Send as HP to POD    Mail Away Pharmacy   Does the patient have enough for 10 days?   [] Yes   [] No - Send as HP to POD

## 2025-05-06 RX ORDER — ALENDRONATE SODIUM 35 MG/1
35 TABLET ORAL
Qty: 12 TABLET | Refills: 1 | Status: SHIPPED | OUTPATIENT
Start: 2025-05-06

## 2025-05-07 ENCOUNTER — TELEPHONE (OUTPATIENT)
Dept: NEPHROLOGY | Facility: CLINIC | Age: 60
End: 2025-05-07

## 2025-05-07 NOTE — TELEPHONE ENCOUNTER
Left msg with pt offering sooner appt with Dr. Castillo on 5/19, 5/20, 5/21, or 5/22. Please transfer call to [place] office for scheduling.

## 2025-06-09 PROBLEM — E83.9 CHRONIC KIDNEY DISEASE-MINERAL AND BONE DISORDER: Status: RESOLVED | Noted: 2022-12-28 | Resolved: 2025-06-09

## 2025-06-09 PROBLEM — M89.9 CHRONIC KIDNEY DISEASE-MINERAL AND BONE DISORDER: Status: RESOLVED | Noted: 2022-12-28 | Resolved: 2025-06-09

## 2025-06-09 PROBLEM — N18.9 CHRONIC KIDNEY DISEASE-MINERAL AND BONE DISORDER: Status: RESOLVED | Noted: 2022-12-28 | Resolved: 2025-06-09

## 2025-06-09 NOTE — PROGRESS NOTES
Name: Eli Pittman      : 1965      MRN: [id number]  Encounter Provider: Cody Castillo MD  Encounter Date: 2025   Encounter department: [facility] [place]  :  Assessment & Plan  Hypertensive chronic kidney disease with stage 1 through stage 4 chronic kidney disease, or unspecified chronic kidney disease  Doing well no changes monitor for orthostatic symptoms in the future    Orders:    Basic metabolic panel; Future    Tacrolimus level; Future    Comprehensive metabolic panel; Future    CBC; Future    Magnesium; Future    Protein / creatinine ratio, urine; Future    Lipid Panel with Direct LDL reflex; Future    Comprehensive metabolic panel; Future    CBC; Future    Magnesium; Future    Protein / creatinine ratio, urine; Future    Tacrolimus level; Future    CKD (chronic kidney disease) stage 2, GFR 60-89 ml/min  Normal as of 2025 we will recheck at this time along with a tacrolimus trough level  Continue current immunosuppressive agents  Continue maintenance recommendations discussed with the patient and reviewed  Orders:    Basic metabolic panel; Future    Tacrolimus level; Future    Comprehensive metabolic panel; Future    CBC; Future    Magnesium; Future    Protein / creatinine ratio, urine; Future    Lipid Panel with Direct LDL reflex; Future    Comprehensive metabolic panel; Future    CBC; Future    Magnesium; Future    Protein / creatinine ratio, urine; Future    Tacrolimus level; Future    Dyslipidemia  Has been at goal no changes  Orders:    Basic metabolic panel; Future    Tacrolimus level; Future    Comprehensive metabolic panel; Future    CBC; Future    Magnesium; Future    Protein / creatinine ratio, urine; Future    Lipid Panel with Direct LDL reflex; Future    Comprehensive metabolic panel; Future    CBC; Future    Magnesium; Future    Protein / creatinine ratio, urine; Future    Tacrolimus level; Future    Secondary hyperparathyroidism of renal origin (HCC)  Which  is normal at this time no changes  Orders:    Comprehensive metabolic panel; Future    CBC; Future    Magnesium; Future    Protein / creatinine ratio, urine; Future    Lipid Panel with Direct LDL reflex; Future    Comprehensive metabolic panel; Future    CBC; Future    Magnesium; Future    Protein / creatinine ratio, urine; Future    Tacrolimus level; Future        History of Present Illness   HPI  Eli Pittman is a 60 y.o. female who presents with follow-up regarding CKD status post LRT  There has been no hospitalizations or acute illnesses since last visit.  The patient overall is feeling well.  Good appetite and good energy  No fevers, chills, or cough or colds.  No hematuria, dysuria, voiding symptoms or foamy urine  No gastrointestinal symptoms  No cardiovascular symptoms including swelling of the legs  No headaches, dizziness or lightheadedness  Blood pressure medications:  Verapamil  mg twice a day    Renal pertinent medications:  Fosamax 35 mg once a week  Atorvastatin 10 mg every other day  Calcium with vitamin D 500 mg - 200 units daily  Magnesium 500 mg daily/  CellCept 500 mg twice a day/prednisone 5 mg daily  Prograf 0.5 mg twice a day on Monday Wednesday and Friday, all other days 0.5 mg in the morning and 1 mg in the evening  Nitrofurantoin 50 mg daily      Review of Systems  Please see HPI, otherwise the review of systems as completely reviewed with the patient are negative    Pertinent Medical History   1.  CKD stage 2.:  Etiology:  Status post LRT 1997.  Baseline creatinine:  Less than 1  Current creatinine: 0.73 from 4/24/2025 at baseline  Urine protein creatinine ratio:  Insignificant at 0.085 g  Tacrolimus trough level : 5.8 at goal from 11/23/2024  Recommendations:  Treat hypertension-please see below  Treat dyslipidemia-please see below  Continue immunosuppressive medications as currently prescribed  Maintain proteinuria less than 1 g or as low as possible  Avoid nephrotoxic agents such as  NSAIDs, patient counseled as such  2.  Volume:  Euvolemic  3.  Hypertension:   Home blood pressure readings:    A.m.:128/72, standing 112/70  P.m. 120/68, standing 107/66  Heart rate: 60-70 range     Goal blood pressure:  Less than 130/80  Recommendations:  Push nonmedical regimen including weight loss, isotonic exercise and avoidance of salt; patient counseled as such  Medication changes today:  Essentially doing well I am going to leave verapamil unchanged as long as she has no orthostatic symptoms at this time just monitor.     4.  Electrolytes:  All acceptable including potassium 4.1     5.  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  Dyslipidemia:  Goal LDL:  Less than 100  Current lipid profile:  LDL 56/HDL 73/triglycerides 85 from 6/29/2024  Recommendations:  No changes at this time as patient is at goal     7.  Anemia:  Normal at 14.2 with normal platelet count     8.  Other problems:  History of urinary tract infections monitored:  Follow-up urinalysis back in April was unremarkable: UA from 4/15/2023 no evidence of urinary tract infection; ultrasound was normal transplant kidney mild increased resistive index from April 24, 2020  Patient with dizziness abnormal ECG from 02/07/2020 which was personally reviewed by Dr. Pate who felt there is no significant change.  Dr. Pate felt she had vertigo which essentially resolved.  No further cardiac testing was recommended at that time.  Osteoporosis on Fosamax vitamin D and calcium follow-up with Dr. Gaming     GI health maintenance: Last colonoscopy 5/9/2024: Diverticulosis/hemorrhoid repeat colonoscopy in 5 years which would be March 2029      Medical History Reviewed by provider this encounter:     .  Past Medical History   Past Medical History[1]  Past Surgical History[2]  Family  History[3]   reports that she has been smoking cigarettes. She has never used smokeless tobacco. She reports that she does not drink alcohol and does not use drugs.  Current Outpatient Medications   Medication Instructions    alendronate (FOSAMAX) 35 mg, Oral, Every 7 days    ALPRAZolam (XANAX) 0.25 mg, Oral, Daily PRN    atorvastatin (LIPITOR) 10 mg tablet TAKE 1 TABLET BY MOUTH EVERY OTHER DAY    Calcium Carbonate 600 mg, Daily    calcium-vitamin D (OSCAL 500 + D) 500 mg-200 units per tablet 1 tablet, Daily    Cequa 0.09 % SOLN     losartan (COZAAR) 25 mg tablet TAKE 2 TABLETS (50 MG TOTAL) BY MOUTH DAILY AFTER DINNER    MAGNESIUM  mg, Daily    mycophenolate (CELLCEPT) 500 mg, Oral, 2 times daily    nitrofurantoin (MACRODANTIN) 50 mg, Oral, Daily    predniSONE 5 mg, Oral, Daily    Sodium Fluoride 5000 Sensitive 1.1-5 % PSTE     tacrolimus (PROGRAF) 0.5 mg capsule Take 1 capsule twice a day on Monday Wednesday and Friday On Tuesday Thursday and Saturday and Sunday take 1 capsule in the am and 2 capsules in the pm    verapamil (CALAN-SR) 240 mg, Oral, Daily (early morning)   Allergies[4]   Medications Ordered Prior to Encounter[5]   Social History[6]     Objective   Ht 5' (1.524 m)   Wt 66.7 kg (147 lb)   BMI 28.71 kg/m²      Physical Exam  BP sitting on right: 120/80 with heart rate of 64 and regular  BP standing on right: Could not auscultate  Physical Exam: General:  No acute distress  Skin:  No acute rash  Eyes:  No scleral icterus and noninjected  ENT:  Moist mucous membranes  Neck:  Supple, no jugular venous distention, trachea midline, overall appearance is normal  Chest:  Clear to auscultation  CVS:  Regular rate and rhythm, without a rub or gallops  Abdomen:  Normal bowel sounds, soft and nontender and nondistended; no tenderness over left lower quadrant transplant site  Extremities:  No edema, and no cyanosis, no significant arthritic changes  Neuro:  No gross focality  Psych:  Alert and oriented  and appropriate             [1]   Past Medical History:  Diagnosis Date    Anxiety     Chronic kidney disease     Hypertension 05/09/2024    Pneumonia     Renal disorder     Urinary tract infection     Vertigo    [2]   Past Surgical History:  Procedure Laterality Date    CHOLECYSTECTOMY      PARATHYROID GLAND SURGERY      TRANSPLANTATION RENAL      Kidney X2    URETERAL REIMPLANTATION OF TRANSPLANTED KIDNEY     [3]   Family History  Problem Relation Name Age of Onset    No Known Problems Mother      Atrial fibrillation Father Aníbal     Cancer Father Aníbal         Prostate    Hearing loss Father Aníbal    [4] No Known Allergies  [5]   Current Outpatient Medications on File Prior to Visit   Medication Sig Dispense Refill    alendronate (FOSAMAX) 35 mg tablet Take 1 tablet (35 mg total) by mouth every 7 days 12 tablet 1    ALPRAZolam (XANAX) 0.25 mg tablet Take 1 tablet (0.25 mg total) by mouth daily as needed for anxiety 30 tablet 0    atorvastatin (LIPITOR) 10 mg tablet TAKE 1 TABLET BY MOUTH EVERY OTHER DAY 45 tablet 2    calcium-vitamin D (OSCAL 500 + D) 500 mg-200 units per tablet Take 1 tablet by mouth in the morning.      Cequa 0.09 % SOLN       losartan (COZAAR) 25 mg tablet TAKE 2 TABLETS (50 MG TOTAL) BY MOUTH DAILY AFTER DINNER (Patient taking differently: Take 25 mg by mouth in the morning and 25 mg in the evening.) 180 tablet 1    MAGNESIUM PO Take 500 mg by mouth in the morning.      mycophenolate (CELLCEPT) 500 mg tablet Take 1 tablet (500 mg total) by mouth 2 (two) times a day 180 tablet 3    nitrofurantoin (MACRODANTIN) 50 mg capsule Take 1 capsule (50 mg total) by mouth daily 90 capsule 3    predniSONE 5 mg tablet Take 1 tablet (5 mg total) by mouth daily 90 tablet 3    Sodium Fluoride 5000 Sensitive 1.1-5 % PSTE       tacrolimus (PROGRAF) 0.5 mg capsule Take 1 capsule twice a day on Monday Wednesday and Friday On Tuesday Thursday and Saturday and Sunday take 1 capsule in the am and 2 capsules in the pm  230 capsule 3    verapamil (CALAN-SR) 240 mg CR tablet TAKE 1 TABLET (240 MG TOTAL) BY MOUTH DAILY IN THE EARLY MORNING (Patient taking differently: Take 120 mg by mouth 2 (two) times a day) 90 tablet 1    Calcium Carbonate 1500 (600 Ca) MG TABS Take 600 mg by mouth daily (Patient not taking: Reported on 12/6/2023)       No current facility-administered medications on file prior to visit.   [6]   Social History  Tobacco Use    Smoking status: Light Smoker     Current packs/day: 0.00     Types: Cigarettes    Smokeless tobacco: Never    Tobacco comments:     Stress   Vaping Use    Vaping status: Never Used   Substance and Sexual Activity    Alcohol use: No    Drug use: No    Sexual activity: Yes     Partners: Male     Birth control/protection: None     Comment: Post menapause

## 2025-06-09 NOTE — ASSESSMENT & PLAN NOTE
Has been at goal no changes  Orders:    Basic metabolic panel; Future    Tacrolimus level; Future    Comprehensive metabolic panel; Future    CBC; Future    Magnesium; Future    Protein / creatinine ratio, urine; Future    Lipid Panel with Direct LDL reflex; Future    Comprehensive metabolic panel; Future    CBC; Future    Magnesium; Future    Protein / creatinine ratio, urine; Future    Tacrolimus level; Future

## 2025-06-09 NOTE — ASSESSMENT & PLAN NOTE
Normal as of 4/24/2025 we will recheck at this time along with a tacrolimus trough level  Continue current immunosuppressive agents  Continue maintenance recommendations discussed with the patient and reviewed  Orders:    Basic metabolic panel; Future    Tacrolimus level; Future    Comprehensive metabolic panel; Future    CBC; Future    Magnesium; Future    Protein / creatinine ratio, urine; Future    Lipid Panel with Direct LDL reflex; Future    Comprehensive metabolic panel; Future    CBC; Future    Magnesium; Future    Protein / creatinine ratio, urine; Future    Tacrolimus level; Future

## 2025-06-09 NOTE — ASSESSMENT & PLAN NOTE
Doing well no changes monitor for orthostatic symptoms in the future    Orders:    Basic metabolic panel; Future    Tacrolimus level; Future    Comprehensive metabolic panel; Future    CBC; Future    Magnesium; Future    Protein / creatinine ratio, urine; Future    Lipid Panel with Direct LDL reflex; Future    Comprehensive metabolic panel; Future    CBC; Future    Magnesium; Future    Protein / creatinine ratio, urine; Future    Tacrolimus level; Future

## 2025-06-11 ENCOUNTER — PATIENT MESSAGE (OUTPATIENT)
Dept: NEPHROLOGY | Facility: CLINIC | Age: 60
End: 2025-06-11

## 2025-06-11 DIAGNOSIS — Z94.0 KIDNEY TRANSPLANTED: Primary | ICD-10-CM

## 2025-06-18 ENCOUNTER — OFFICE VISIT (OUTPATIENT)
Dept: NEPHROLOGY | Facility: CLINIC | Age: 60
End: 2025-06-18
Payer: COMMERCIAL

## 2025-06-18 VITALS — WEIGHT: 147 LBS | HEIGHT: 60 IN | BODY MASS INDEX: 28.86 KG/M2

## 2025-06-18 DIAGNOSIS — N25.81 SECONDARY HYPERPARATHYROIDISM OF RENAL ORIGIN (HCC): ICD-10-CM

## 2025-06-18 DIAGNOSIS — E78.5 DYSLIPIDEMIA: ICD-10-CM

## 2025-06-18 DIAGNOSIS — N18.2 CKD (CHRONIC KIDNEY DISEASE) STAGE 2, GFR 60-89 ML/MIN: ICD-10-CM

## 2025-06-18 DIAGNOSIS — I12.9 HYPERTENSIVE CHRONIC KIDNEY DISEASE WITH STAGE 1 THROUGH STAGE 4 CHRONIC KIDNEY DISEASE, OR UNSPECIFIED CHRONIC KIDNEY DISEASE: Primary | ICD-10-CM

## 2025-06-18 PROCEDURE — 99214 OFFICE O/P EST MOD 30 MIN: CPT | Performed by: INTERNAL MEDICINE

## 2025-06-18 NOTE — PATIENT INSTRUCTIONS
Visit summary:  - Overall kidney function has been stable were going to get some labs on you now along with a tacrolimus trough level    - Please see below for maintenance follow-up recommended on immunosuppressive medications    -Blood pressure acceptable if you get any dizziness or lightheadedness please let me know    1. Medication changes today:  None today    2.  General instructions:  Continue to avoid salt  Continue excellent exercising    3.  Please go for non fasting  lab work at this time but before you take your morning tacrolimus/Prograf    4.  Please go for fasting lab work and again before your morning dose of tacrolimus August 2025    5.  Please take 1 week a blood pressure readings prior to your next appointment    AS FOLLOWS  MORNING AND EVENING, SITTING AND STANDING as follows:  TAKE THE MORNING READINGS BEFORE ANY MEDICATIONS AND WHEN YOU ARE RELAXED FOR SEVERAL MINUTES  TAKE THE EVENING READINGS:  BETWEEN 7-10 P.M.; PRIOR TO ANY MEDICATIONS; AT LEAST IN OUR  FROM DINNER; AND CERTAINLY AFTER RELAXING FOR A FEW MINUTES  PLEASE INCLUDE HEART RATE WITH YOUR BLOOD PRESSURE READINGS  When taking standing readings, keep your arm supported at heart level and not dangling  Make sure you are sitting with your back supported and feet on the ground and do not cross your legs or feet  Make sure you have not taken any coffee or caffeine products or exercised or smoke cigarettes at least 30 minutes before taking your blood pressure  Then please mail these readings into the office            6.  Follow-up in 5-6 months  Please bring in 1 week a blood pressure readings morning evening, sitting and standing is outlined above  PLEASE BRING AN YOUR BLOOD PRESSURE MACHINE TO CORRELATE WITH THE OFFICE MACHINE AT THIS NEXT SCHEDULED VISIT  Please go for fasting lab work 1-2 weeks prior to your appointment      7.  General non medical recommendations:  AVOID SALT BUT NOT ADDING AN READING LABELS TO MAKE SURE  THERE IS LOW-SALT IN THE FOOD THAT YOU ARE EATING  Goal is less than 2 g of sodium intake or less than 5 g of sodium chloride intake per day    Avoid nonsteroidal anti-inflammatory drugs such as Naprosyn, ibuprofen, Aleve, Advil, Celebrex, Meloxicam (Mobic) etc.  You can use Tylenol as needed if you do not have any liver condition to be concerned about    Avoid medications such as Sudafed or decongestants and antihistamines that contained the D component which is the decongestant.  You can take antihistamines without the decongestant or D component.    Try to avoid medications such as pantoprazole or  Protonix/Nexium or Esomeprazole)/Prilosec or omeprazole/Prevacid or lansoprazole/AcipHex or Rabeprazole.  If you are able to, use Pepcid as this is safer for your kidneys.    Please do not drink more than 2 glasses of alcohol/wine on a daily basis as this may contribute to your high blood pressure.      8.Maintenance recommendations:  Please see your dentist at least twice a year for oral/mouth cancer screening  Please see your dermatologist/skin doctor at least twice a year for skin cancer screening  Please follow-up with your gastroenterologist for colon cancer screening when  appropriate per their recommendations  Please see your obstetrician/gynecologist for cancer screening at least once a year per their recommendations  Please obtain the following vaccines:  Yearly flu/influenza vaccine  Pneumonia vaccine every 3-5 years:  Prevnar 13 and Pneumovax 23  Shingrix which is the new non live virus, this is against shingles/herpes zoster  Tetanus:Td/DTaP vaccine   COVID-19 vaccine:  At this time only MODERNA AND PFIZER.  DO NOT EXCEPT OR GET THE J AND J VACCINE!!  YOU SHOULD NEVER GET ANY LIVE VIRUSES:  INCLUDING ZOSTAVAX OR MMR:  SHOULD NEVER BE TAKEN    BECAUSE OF YOUR KIDNEY TRANSPLANT , YOU ARE TAKING IMMUNE SUPPRESSING MEDICATIONS WHICH MAKES YOU MORE SUSCEPTIBLE TO NOT ONLY KIRTI/CATCHING COVID-19 VIRUS, BUT  ALSO SUFFERING FROM ITS POTENTIAL SEVERE COMPLICATIONS.  BECAUSE OF THIS, I WOULD STRONGLY URGE YOU TO TRY TO AVOID CATCHING/KIRTI COVID-19 VIRUS AS MUCH AS POSSIBLE.  CERTAINLY IF YOU DEVELOPED ANY SYMPTOMS SUGGESTIVE OF COVID-19 VIRUS PLEASE CONTACT YOUR MEDICAL PHYSICIAN RIGHT AWAY, OR IF YOU ARE VERY SICK, PLEASE GO RIGHT TO THE HOSPITAL

## 2025-06-18 NOTE — ASSESSMENT & PLAN NOTE
Which is normal at this time no changes  Orders:    Comprehensive metabolic panel; Future    CBC; Future    Magnesium; Future    Protein / creatinine ratio, urine; Future    Lipid Panel with Direct LDL reflex; Future    Comprehensive metabolic panel; Future    CBC; Future    Magnesium; Future    Protein / creatinine ratio, urine; Future    Tacrolimus level; Future

## 2025-06-18 NOTE — LETTER
2025     Luis Fernando Gaming MD  [address]  [address]  [facility] [address]    Patient: Eli Pittman   YOB: 1965   Date of Visit: 2025       Dear Dr. Luis Fernando Gaming MD:    Thank you for referring Eli Pittman to me for evaluation. Below are my notes for this consultation.    If you have questions, please do not hesitate to call me. I look forward to following your patient along with you.         Sincerely,        Coyd Castillo MD        CC: No Recipients    Cody Castillo MD  2025  7:54 AM  Sign when Signing Visit  Name: Eli Pittamn      : 1965      MRN: [id number]  Encounter Provider: Cody Castillo MD  Encounter Date: 2025   Encounter department: [facility] [place]  :  Assessment & Plan  Hypertensive chronic kidney disease with stage 1 through stage 4 chronic kidney disease, or unspecified chronic kidney disease  Doing well no changes monitor for orthostatic symptoms in the future    Orders:  •  Basic metabolic panel; Future  •  Tacrolimus level; Future  •  Comprehensive metabolic panel; Future  •  CBC; Future  •  Magnesium; Future  •  Protein / creatinine ratio, urine; Future  •  Lipid Panel with Direct LDL reflex; Future  •  Comprehensive metabolic panel; Future  •  CBC; Future  •  Magnesium; Future  •  Protein / creatinine ratio, urine; Future  •  Tacrolimus level; Future    CKD (chronic kidney disease) stage 2, GFR 60-89 ml/min  Normal as of 2025 we will recheck at this time along with a tacrolimus trough level  Continue current immunosuppressive agents  Continue maintenance recommendations discussed with the patient and reviewed  Orders:  •  Basic metabolic panel; Future  •  Tacrolimus level; Future  •  Comprehensive metabolic panel; Future  •  CBC; Future  •  Magnesium; Future  •  Protein / creatinine ratio, urine; Future  •  Lipid Panel with Direct LDL reflex; Future  •  Comprehensive metabolic panel; Future  •  CBC; Future  •   Magnesium; Future  •  Protein / creatinine ratio, urine; Future  •  Tacrolimus level; Future    Dyslipidemia  Has been at goal no changes  Orders:  •  Basic metabolic panel; Future  •  Tacrolimus level; Future  •  Comprehensive metabolic panel; Future  •  CBC; Future  •  Magnesium; Future  •  Protein / creatinine ratio, urine; Future  •  Lipid Panel with Direct LDL reflex; Future  •  Comprehensive metabolic panel; Future  •  CBC; Future  •  Magnesium; Future  •  Protein / creatinine ratio, urine; Future  •  Tacrolimus level; Future    Secondary hyperparathyroidism of renal origin (HCC)  Which is normal at this time no changes  Orders:  •  Comprehensive metabolic panel; Future  •  CBC; Future  •  Magnesium; Future  •  Protein / creatinine ratio, urine; Future  •  Lipid Panel with Direct LDL reflex; Future  •  Comprehensive metabolic panel; Future  •  CBC; Future  •  Magnesium; Future  •  Protein / creatinine ratio, urine; Future  •  Tacrolimus level; Future        History of Present Illness  HPI  Eli Pittman is a 60 y.o. female who presents with follow-up regarding CKD status post LRT  There has been no hospitalizations or acute illnesses since last visit.  The patient overall is feeling well.  Good appetite and good energy  No fevers, chills, or cough or colds.  No hematuria, dysuria, voiding symptoms or foamy urine  No gastrointestinal symptoms  No cardiovascular symptoms including swelling of the legs  No headaches, dizziness or lightheadedness  Blood pressure medications:  Verapamil  mg twice a day    Renal pertinent medications:  Fosamax 35 mg once a week  Atorvastatin 10 mg every other day  Calcium with vitamin D 500 mg - 200 units daily  Magnesium 500 mg daily/  CellCept 500 mg twice a day/prednisone 5 mg daily  Prograf 0.5 mg twice a day on Monday Wednesday and Friday, all other days 0.5 mg in the morning and 1 mg in the evening  Nitrofurantoin 50 mg daily      Review of Systems  Please see HPI,  otherwise the review of systems as completely reviewed with the patient are negative    Pertinent Medical History  1.  CKD stage 2.:  Etiology:  Status post LRT 1997.  Baseline creatinine:  Less than 1  Current creatinine: 0.73 from 4/24/2025 at baseline  Urine protein creatinine ratio:  Insignificant at 0.085 g  Tacrolimus trough level : 5.8 at goal from 11/23/2024  Recommendations:  Treat hypertension-please see below  Treat dyslipidemia-please see below  Continue immunosuppressive medications as currently prescribed  Maintain proteinuria less than 1 g or as low as possible  Avoid nephrotoxic agents such as NSAIDs, patient counseled as such  2.  Volume:  Euvolemic  3.  Hypertension:   Home blood pressure readings:    A.m.:128/72, standing 112/70  P.m. 120/68, standing 107/66  Heart rate: 60-70 range     Goal blood pressure:  Less than 130/80  Recommendations:  Push nonmedical regimen including weight loss, isotonic exercise and avoidance of salt; patient counseled as such  Medication changes today:  Essentially doing well I am going to leave verapamil unchanged as long as she has no orthostatic symptoms at this time just monitor.     4.  Electrolytes:  All acceptable including potassium 4.1     5.  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  Dyslipidemia:  Goal LDL:  Less than 100  Current lipid profile:  LDL 56/HDL 73/triglycerides 85 from 6/29/2024  Recommendations:  No changes at this time as patient is at goal     7.  Anemia:  Normal at 14.2 with normal platelet count     8.  Other problems:  History of urinary tract infections monitored:  Follow-up urinalysis back in April was unremarkable: UA from 4/15/2023 no evidence of urinary tract infection; ultrasound was normal transplant kidney mild increased resistive index from April 24,  2020  Patient with dizziness abnormal ECG from 02/07/2020 which was personally reviewed by Dr. Pate who felt there is no significant change.  Dr. Pate felt she had vertigo which essentially resolved.  No further cardiac testing was recommended at that time.  Osteoporosis on Fosamax vitamin D and calcium follow-up with Dr. Gaming      health maintenance: Last colonoscopy 5/9/2024: Diverticulosis/hemorrhoid repeat colonoscopy in 5 years which would be March 2029      Medical History Reviewed by provider this encounter:     .  Past Medical History  Past Medical History[1]  Past Surgical History[2]  Family History[3]   reports that she has been smoking cigarettes. She has never used smokeless tobacco. She reports that she does not drink alcohol and does not use drugs.  Current Outpatient Medications   Medication Instructions   • alendronate (FOSAMAX) 35 mg, Oral, Every 7 days   • ALPRAZolam (XANAX) 0.25 mg, Oral, Daily PRN   • atorvastatin (LIPITOR) 10 mg tablet TAKE 1 TABLET BY MOUTH EVERY OTHER DAY   • Calcium Carbonate 600 mg, Daily   • calcium-vitamin D (OSCAL 500 + D) 500 mg-200 units per tablet 1 tablet, Daily   • Cequa 0.09 % SOLN    • losartan (COZAAR) 25 mg tablet TAKE 2 TABLETS (50 MG TOTAL) BY MOUTH DAILY AFTER DINNER   • MAGNESIUM  mg, Daily   • mycophenolate (CELLCEPT) 500 mg, Oral, 2 times daily   • nitrofurantoin (MACRODANTIN) 50 mg, Oral, Daily   • predniSONE 5 mg, Oral, Daily   • Sodium Fluoride 5000 Sensitive 1.1-5 % PSTE    • tacrolimus (PROGRAF) 0.5 mg capsule Take 1 capsule twice a day on Monday Wednesday and Friday On Tuesday Thursday and Saturday and Sunday take 1 capsule in the am and 2 capsules in the pm   • verapamil (CALAN-SR) 240 mg, Oral, Daily (early morning)   Allergies[4]   Medications Ordered Prior to Encounter[5]   Social History[6]     Objective  Ht 5' (1.524 m)   Wt 66.7 kg (147 lb)   BMI 28.71 kg/m²      Physical Exam  BP sitting on right: 120/80 with heart rate of 64 and  regular  BP standing on right: Could not auscultate  Physical Exam: General:  No acute distress  Skin:  No acute rash  Eyes:  No scleral icterus and noninjected  ENT:  Moist mucous membranes  Neck:  Supple, no jugular venous distention, trachea midline, overall appearance is normal  Chest:  Clear to auscultation  CVS:  Regular rate and rhythm, without a rub or gallops  Abdomen:  Normal bowel sounds, soft and nontender and nondistended; no tenderness over left lower quadrant transplant site  Extremities:  No edema, and no cyanosis, no significant arthritic changes  Neuro:  No gross focality  Psych:  Alert and oriented and appropriate             [1]   Past Medical History:  Diagnosis Date   • Anxiety    • Chronic kidney disease    • Hypertension 05/09/2024   • Pneumonia    • Renal disorder    • Urinary tract infection    • Vertigo    [2]   Past Surgical History:  Procedure Laterality Date   • CHOLECYSTECTOMY     • PARATHYROID GLAND SURGERY     • TRANSPLANTATION RENAL      Kidney X2   • URETERAL REIMPLANTATION OF TRANSPLANTED KIDNEY     [3]   Family History  Problem Relation Name Age of Onset   • No Known Problems Mother     • Atrial fibrillation Father Aníbal    • Cancer Father Aníbal         Prostate   • Hearing loss Father Aníbal    [4] No Known Allergies  [5]   Current Outpatient Medications on File Prior to Visit   Medication Sig Dispense Refill   • alendronate (FOSAMAX) 35 mg tablet Take 1 tablet (35 mg total) by mouth every 7 days 12 tablet 1   • ALPRAZolam (XANAX) 0.25 mg tablet Take 1 tablet (0.25 mg total) by mouth daily as needed for anxiety 30 tablet 0   • atorvastatin (LIPITOR) 10 mg tablet TAKE 1 TABLET BY MOUTH EVERY OTHER DAY 45 tablet 2   • calcium-vitamin D (OSCAL 500 + D) 500 mg-200 units per tablet Take 1 tablet by mouth in the morning.     • Cequa 0.09 % SOLN      • losartan (COZAAR) 25 mg tablet TAKE 2 TABLETS (50 MG TOTAL) BY MOUTH DAILY AFTER DINNER (Patient taking differently: Take 25 mg by mouth in  the morning and 25 mg in the evening.) 180 tablet 1   • MAGNESIUM PO Take 500 mg by mouth in the morning.     • mycophenolate (CELLCEPT) 500 mg tablet Take 1 tablet (500 mg total) by mouth 2 (two) times a day 180 tablet 3   • nitrofurantoin (MACRODANTIN) 50 mg capsule Take 1 capsule (50 mg total) by mouth daily 90 capsule 3   • predniSONE 5 mg tablet Take 1 tablet (5 mg total) by mouth daily 90 tablet 3   • Sodium Fluoride 5000 Sensitive 1.1-5 % PSTE      • tacrolimus (PROGRAF) 0.5 mg capsule Take 1 capsule twice a day on Monday Wednesday and Friday On Tuesday Thursday and Saturday and Sunday take 1 capsule in the am and 2 capsules in the pm 230 capsule 3   • verapamil (CALAN-SR) 240 mg CR tablet TAKE 1 TABLET (240 MG TOTAL) BY MOUTH DAILY IN THE EARLY MORNING (Patient taking differently: Take 120 mg by mouth 2 (two) times a day) 90 tablet 1   • Calcium Carbonate 1500 (600 Ca) MG TABS Take 600 mg by mouth daily (Patient not taking: Reported on 12/6/2023)       No current facility-administered medications on file prior to visit.   [6]   Social History  Tobacco Use   • Smoking status: Light Smoker     Current packs/day: 0.00     Types: Cigarettes   • Smokeless tobacco: Never   • Tobacco comments:     Stress   Vaping Use   • Vaping status: Never Used   Substance and Sexual Activity   • Alcohol use: No   • Drug use: No   • Sexual activity: Yes     Partners: Male     Birth control/protection: None     Comment: Post menapause

## 2025-06-23 DIAGNOSIS — I12.9 PARENCHYMAL RENAL HYPERTENSION, STAGE 1 THROUGH STAGE 4 OR UNSPECIFIED CHRONIC KIDNEY DISEASE: ICD-10-CM

## 2025-06-23 DIAGNOSIS — E78.5 DYSLIPIDEMIA: ICD-10-CM

## 2025-06-24 RX ORDER — VERAPAMIL HYDROCHLORIDE 240 MG/1
240 TABLET, FILM COATED, EXTENDED RELEASE ORAL
Qty: 90 TABLET | Refills: 1 | Status: SHIPPED | OUTPATIENT
Start: 2025-06-24

## 2025-06-24 RX ORDER — ATORVASTATIN CALCIUM 10 MG/1
10 TABLET, FILM COATED ORAL EVERY OTHER DAY
Qty: 45 TABLET | Refills: 1 | Status: SHIPPED | OUTPATIENT
Start: 2025-06-24

## 2025-06-30 ENCOUNTER — RESULTS FOLLOW-UP (OUTPATIENT)
Dept: NEPHROLOGY | Facility: CLINIC | Age: 60
End: 2025-06-30

## 2025-06-30 DIAGNOSIS — Z94.0 KIDNEY TRANSPLANTED: ICD-10-CM

## 2025-06-30 DIAGNOSIS — D84.9 IMMUNOSUPPRESSION (HCC): Primary | ICD-10-CM

## 2025-06-30 NOTE — TELEPHONE ENCOUNTER
----- Message from Cody Castillo MD sent at 6/29/2025  9:18 AM EDT -----  Labs look great no changes  ----- Message -----  From: Dayana Corey Lab Results In  Sent: 6/29/2025   5:30 AM EDT  To: Cody Castillo MD

## 2025-07-01 LAB
BUN SERPL-MCNC: 16 MG/DL (ref 7–25)
BUN/CREAT SERPL: NORMAL (CALC) (ref 6–22)
CALCIUM SERPL-MCNC: 9.3 MG/DL (ref 8.6–10.4)
CHLORIDE SERPL-SCNC: 107 MMOL/L (ref 98–110)
CO2 SERPL-SCNC: 25 MMOL/L (ref 20–32)
CREAT SERPL-MCNC: 0.77 MG/DL (ref 0.5–1.03)
GFR/BSA.PRED SERPLBLD CYS-BASED-ARV: 89 ML/MIN/1.73M2
GLUCOSE SERPL-MCNC: 78 MG/DL (ref 65–99)
POTASSIUM SERPL-SCNC: 4.2 MMOL/L (ref 3.5–5.3)
SODIUM SERPL-SCNC: 141 MMOL/L (ref 135–146)
TACROLIMUS BLD-MCNC: 7.7 MCG/L

## 2025-07-01 NOTE — TELEPHONE ENCOUNTER
If the tacrolimus trough level was truly a trough level her levels a little too high  She should be taking 0.5 mg twice a day on Monday Wednesday and Friday the other day 0.5 mg in the morning and 1 mg in the evening  Let us switch to just 0.5 mg twice a day every day for now  Recheck a tacrolimus trough level 1 week later please

## 2025-07-09 DIAGNOSIS — Z79.2 NEED FOR PROPHYLACTIC ANTIBIOTIC: ICD-10-CM

## 2025-07-09 RX ORDER — NITROFURANTOIN MACROCRYSTALS 50 MG/1
50 CAPSULE ORAL DAILY
Qty: 90 CAPSULE | Refills: 3 | Status: SHIPPED | OUTPATIENT
Start: 2025-07-09

## 2025-08-23 LAB
ALBUMIN SERPL-MCNC: 4.1 G/DL (ref 3.6–5.1)
ALBUMIN/GLOB SERPL: 1.7 (CALC) (ref 1–2.5)
ALP SERPL-CCNC: 53 U/L (ref 37–153)
ALT SERPL-CCNC: 11 U/L (ref 6–29)
AST SERPL-CCNC: 13 U/L (ref 10–35)
BILIRUB SERPL-MCNC: 0.6 MG/DL (ref 0.2–1.2)
BUN SERPL-MCNC: 11 MG/DL (ref 7–25)
BUN/CREAT SERPL: NORMAL (CALC) (ref 6–22)
CALCIUM SERPL-MCNC: 9.3 MG/DL (ref 8.6–10.4)
CHLORIDE SERPL-SCNC: 106 MMOL/L (ref 98–110)
CHOLEST SERPL-MCNC: 152 MG/DL
CHOLEST/HDLC SERPL: 2.1 (CALC)
CO2 SERPL-SCNC: 28 MMOL/L (ref 20–32)
CREAT SERPL-MCNC: 0.79 MG/DL (ref 0.5–1.03)
CREAT UR-MCNC: 77 MG/DL (ref 20–275)
ERYTHROCYTE [DISTWIDTH] IN BLOOD BY AUTOMATED COUNT: 12.3 % (ref 11–15)
GFR/BSA.PRED SERPLBLD CYS-BASED-ARV: 87 ML/MIN/1.73M2
GLOBULIN SER CALC-MCNC: 2.4 G/DL (CALC) (ref 1.9–3.7)
GLUCOSE SERPL-MCNC: 70 MG/DL (ref 65–99)
HCT VFR BLD AUTO: 45.5 % (ref 35–45)
HDLC SERPL-MCNC: 73 MG/DL
HGB BLD-MCNC: 15.1 G/DL (ref 11.7–15.5)
LDLC SERPL CALC-MCNC: 60 MG/DL (CALC)
MAGNESIUM SERPL-MCNC: 1.6 MG/DL (ref 1.5–2.5)
MCH RBC QN AUTO: 32.2 PG (ref 27–33)
MCHC RBC AUTO-ENTMCNC: 33.2 G/DL (ref 32–36)
MCV RBC AUTO: 97 FL (ref 80–100)
NONHDLC SERPL-MCNC: 79 MG/DL (CALC)
PLATELET # BLD AUTO: 205 THOUSAND/UL (ref 140–400)
PMV BLD REES-ECKER: 10.5 FL (ref 7.5–12.5)
POTASSIUM SERPL-SCNC: 4.1 MMOL/L (ref 3.5–5.3)
PROT SERPL-MCNC: 6.5 G/DL (ref 6.1–8.1)
PROT UR-MCNC: 5 MG/DL (ref 5–24)
PROT/CREAT UR: 0.07 MG/MG CREAT (ref 0.02–0.18)
PROT/CREAT UR: 65 MG/G CREAT (ref 24–184)
RBC # BLD AUTO: 4.69 MILLION/UL (ref 3.8–5.1)
SODIUM SERPL-SCNC: 141 MMOL/L (ref 135–146)
TRIGL SERPL-MCNC: 103 MG/DL
WBC # BLD AUTO: 7.8 THOUSAND/UL (ref 3.8–10.8)